# Patient Record
Sex: FEMALE | Race: WHITE | NOT HISPANIC OR LATINO | Employment: FULL TIME | URBAN - METROPOLITAN AREA
[De-identification: names, ages, dates, MRNs, and addresses within clinical notes are randomized per-mention and may not be internally consistent; named-entity substitution may affect disease eponyms.]

---

## 2020-08-10 PROBLEM — E66.812 CLASS 2 DRUG-INDUCED OBESITY WITHOUT SERIOUS COMORBIDITY WITH BODY MASS INDEX (BMI) OF 37.0 TO 37.9 IN ADULT: Status: ACTIVE | Noted: 2020-08-10

## 2022-05-27 PROBLEM — IMO0001 SMOKING: Status: ACTIVE | Noted: 2022-05-27

## 2022-09-12 DIAGNOSIS — Z97.5 BREAKTHROUGH BLEEDING ASSOCIATED WITH INTRAUTERINE DEVICE (IUD): Primary | ICD-10-CM

## 2022-09-12 DIAGNOSIS — N92.1 BREAKTHROUGH BLEEDING ASSOCIATED WITH INTRAUTERINE DEVICE (IUD): Primary | ICD-10-CM

## 2022-09-12 DIAGNOSIS — R10.2 PELVIC PAIN: ICD-10-CM

## 2022-09-12 DIAGNOSIS — Z97.5 IUD CONTRACEPTION: ICD-10-CM

## 2023-07-13 NOTE — PROGRESS NOTES
[facility] [address] [place] [address]    ASSESSMENT/PLAN: Sherie Spencer is a 33 y.o. Yessenia Friend who presents for annual gynecologic exam.    Encounter for routine gynecologic examination  - Routine well woman exam completed today. - Cervical Cancer Screening: Current ASCCP Guidelines reviewed. Last Pap: 08/04/2021 normal. Next Pap Due: next year  - HPV Vaccination status: Immunization series complete  - STI screening offered including HIV testing: not indicated based on hx or requested at time of visit  - Contraceptive counseling discussed. Current contraception: [facility] 1/2020  - RTO 1 yr     Additional problems addressed during this visit:  1. Encounter for gynecological examination (general) (routine) without abnormal findings    2. IUD check up    - IUD string not visualized, u/s 9/2022- IUD in proper position    CC:  Annual Gynecologic Examination    HPI: Sherie Spencer is a 33 y.o. Yessenia Friend who presents for annual gynecologic examination. Erasto Prieto presents for gyn exam today. 7/7/2023 LMP. Menses is regular, flow is heavy some months and lasting 9-13 days. Using [facility] as birth control method. Planning pregnancy when IUD removed. Last pap smear 2021. History of abnormal pap smear- no. Sexually active- yes, with partner for 3 yrs. HPV vaccine - completed. Does not desire STI testing. 7 hrs sleep per day. 1-2 servings of calcium rich food per day. Was walking 7 days per week. 1-2 servings of caffeine per day. Does not perform SBE monthly. Safe at home - yes. Wears seat belts. Concerns : none        The following portions of the patient's history were reviewed and updated as appropriate: allergies, current medications, past family history, past medical history, obstetric history, gynecologic history, past social history, past surgical history and problem list.    Review of Systems   Constitutional: Negative for chills, fatigue and fever.    Respiratory: Negative for cough and shortness of breath. Cardiovascular: Negative for chest pain, palpitations and leg swelling. Gastrointestinal: Negative for constipation and diarrhea. Genitourinary: Positive for frequency. Negative for difficulty urinating, dysuria, menstrual problem, pelvic pain, urgency, vaginal bleeding, vaginal discharge and vaginal pain. Neurological: Negative for light-headedness and headaches. Psychiatric/Behavioral: The patient is nervous/anxious. Objective:  /72   Ht 5' 3" (1.6 m)   Wt 90.7 kg (200 lb)   LMP 07/07/2023 (Exact Date)   BMI 35.43 kg/m²    Physical Exam  Vitals reviewed. Constitutional:       Appearance: Normal appearance. HENT:      Head: Normocephalic. Neck:      Thyroid: No thyroid mass or thyroid tenderness. Cardiovascular:      Rate and Rhythm: Normal rate and regular rhythm. Heart sounds: Normal heart sounds. Pulmonary:      Effort: Pulmonary effort is normal.      Breath sounds: Normal breath sounds. Chest:   Breasts:     Right: No mass, nipple discharge, skin change or tenderness. Left: No mass, nipple discharge, skin change or tenderness. Abdominal:      General: There is no distension. Palpations: There is no mass. Tenderness: There is no abdominal tenderness. There is no guarding. Genitourinary:     General: Normal vulva. Exam position: Lithotomy position. Labia:         Right: No tenderness or lesion. Left: No tenderness or lesion. Vagina: No vaginal discharge, tenderness, bleeding or lesions. Cervix: No discharge, lesion, erythema or cervical bleeding. Uterus: Normal. Not enlarged and not tender. Adnexa:         Right: No mass, tenderness or fullness. Left: No mass, tenderness or fullness. Comments: IUD not visualized. Had ultrasound 9/2022- IUD in place  Musculoskeletal:      Cervical back: Normal range of motion.    Lymphadenopathy:      Upper Body:      Right upper body: No axillary adenopathy. Left upper body: No axillary adenopathy. Skin:     General: Skin is warm and dry. Neurological:      Mental Status: She is alert.    Psychiatric:         Mood and Affect: Mood normal.         Behavior: Behavior normal.         Judgment: Judgment normal.

## 2023-07-14 ENCOUNTER — ANNUAL EXAM (OUTPATIENT)
Dept: OBGYN CLINIC | Facility: CLINIC | Age: 28
End: 2023-07-14
Payer: COMMERCIAL

## 2023-07-14 VITALS
WEIGHT: 200 LBS | SYSTOLIC BLOOD PRESSURE: 118 MMHG | HEIGHT: 63 IN | BODY MASS INDEX: 35.44 KG/M2 | DIASTOLIC BLOOD PRESSURE: 72 MMHG

## 2023-07-14 DIAGNOSIS — Z30.431 IUD CHECK UP: ICD-10-CM

## 2023-07-14 DIAGNOSIS — Z01.419 ENCOUNTER FOR GYNECOLOGICAL EXAMINATION (GENERAL) (ROUTINE) WITHOUT ABNORMAL FINDINGS: Primary | ICD-10-CM

## 2023-07-14 PROCEDURE — S0612 ANNUAL GYNECOLOGICAL EXAMINA: HCPCS | Performed by: ADVANCED PRACTICE MIDWIFE

## 2023-07-14 RX ORDER — AMITRIPTYLINE HYDROCHLORIDE 10 MG/1
10 TABLET, FILM COATED ORAL
COMMUNITY
Start: 2023-04-17 | End: 2023-07-14 | Stop reason: ALTCHOICE

## 2023-07-14 RX ORDER — LEVONORGESTREL 19.5 MG/1
1 INTRAUTERINE DEVICE INTRAUTERINE
COMMUNITY
Start: 2020-01-30

## 2023-10-19 ENCOUNTER — OFFICE VISIT (OUTPATIENT)
Dept: FAMILY MEDICINE CLINIC | Facility: CLINIC | Age: 28
End: 2023-10-19
Payer: COMMERCIAL

## 2023-10-19 ENCOUNTER — APPOINTMENT (OUTPATIENT)
Dept: LAB | Facility: CLINIC | Age: 28
End: 2023-10-19
Payer: COMMERCIAL

## 2023-10-19 VITALS
OXYGEN SATURATION: 98 % | TEMPERATURE: 98 F | HEIGHT: 63 IN | RESPIRATION RATE: 18 BRPM | WEIGHT: 201.6 LBS | SYSTOLIC BLOOD PRESSURE: 132 MMHG | DIASTOLIC BLOOD PRESSURE: 80 MMHG | BODY MASS INDEX: 35.72 KG/M2 | HEART RATE: 71 BPM

## 2023-10-19 DIAGNOSIS — B35.1 NAIL FUNGAL INFECTION: ICD-10-CM

## 2023-10-19 DIAGNOSIS — E66.1 CLASS 2 DRUG-INDUCED OBESITY WITHOUT SERIOUS COMORBIDITY WITH BODY MASS INDEX (BMI) OF 37.0 TO 37.9 IN ADULT: ICD-10-CM

## 2023-10-19 DIAGNOSIS — E53.8 VITAMIN B12 DEFICIENCY: ICD-10-CM

## 2023-10-19 DIAGNOSIS — E55.9 VITAMIN D DEFICIENCY: ICD-10-CM

## 2023-10-19 DIAGNOSIS — Z00.00 ENCOUNTER FOR WELL ADULT EXAM WITHOUT ABNORMAL FINDINGS: Primary | ICD-10-CM

## 2023-10-19 PROBLEM — K58.0 IRRITABLE BOWEL SYNDROME WITH DIARRHEA: Status: RESOLVED | Noted: 2017-10-17 | Resolved: 2023-10-19

## 2023-10-19 LAB
25(OH)D3 SERPL-MCNC: 17.1 NG/ML (ref 30–100)
ALBUMIN SERPL BCP-MCNC: 4.3 G/DL (ref 3.5–5)
ALP SERPL-CCNC: 57 U/L (ref 34–104)
ALT SERPL W P-5'-P-CCNC: 17 U/L (ref 7–52)
ANION GAP SERPL CALCULATED.3IONS-SCNC: 10 MMOL/L
AST SERPL W P-5'-P-CCNC: 16 U/L (ref 13–39)
BASOPHILS # BLD AUTO: 0.03 THOUSANDS/ÂΜL (ref 0–0.1)
BASOPHILS NFR BLD AUTO: 0 % (ref 0–1)
BILIRUB SERPL-MCNC: 0.42 MG/DL (ref 0.2–1)
BUN SERPL-MCNC: 19 MG/DL (ref 5–25)
CALCIUM SERPL-MCNC: 10.4 MG/DL (ref 8.4–10.2)
CHLORIDE SERPL-SCNC: 105 MMOL/L (ref 96–108)
CHOLEST SERPL-MCNC: 135 MG/DL
CO2 SERPL-SCNC: 25 MMOL/L (ref 21–32)
CREAT SERPL-MCNC: 0.85 MG/DL (ref 0.6–1.3)
EOSINOPHIL # BLD AUTO: 0 THOUSAND/ÂΜL (ref 0–0.61)
EOSINOPHIL NFR BLD AUTO: 0 % (ref 0–6)
ERYTHROCYTE [DISTWIDTH] IN BLOOD BY AUTOMATED COUNT: 11.9 % (ref 11.6–15.1)
GFR SERPL CREATININE-BSD FRML MDRD: 94 ML/MIN/1.73SQ M
GLUCOSE P FAST SERPL-MCNC: 114 MG/DL (ref 65–99)
HCT VFR BLD AUTO: 39.6 % (ref 34.8–46.1)
HDLC SERPL-MCNC: 47 MG/DL
HGB BLD-MCNC: 13.6 G/DL (ref 11.5–15.4)
IMM GRANULOCYTES # BLD AUTO: 0.04 THOUSAND/UL (ref 0–0.2)
IMM GRANULOCYTES NFR BLD AUTO: 1 % (ref 0–2)
LDLC SERPL CALC-MCNC: 72 MG/DL (ref 0–100)
LYMPHOCYTES # BLD AUTO: 1.99 THOUSANDS/ÂΜL (ref 0.6–4.47)
LYMPHOCYTES NFR BLD AUTO: 27 % (ref 14–44)
MCH RBC QN AUTO: 30.5 PG (ref 26.8–34.3)
MCHC RBC AUTO-ENTMCNC: 34.3 G/DL (ref 31.4–37.4)
MCV RBC AUTO: 89 FL (ref 82–98)
MONOCYTES # BLD AUTO: 0.52 THOUSAND/ÂΜL (ref 0.17–1.22)
MONOCYTES NFR BLD AUTO: 7 % (ref 4–12)
NEUTROPHILS # BLD AUTO: 4.83 THOUSANDS/ÂΜL (ref 1.85–7.62)
NEUTS SEG NFR BLD AUTO: 65 % (ref 43–75)
NRBC BLD AUTO-RTO: 0 /100 WBCS
PLATELET # BLD AUTO: 329 THOUSANDS/UL (ref 149–390)
PMV BLD AUTO: 9.8 FL (ref 8.9–12.7)
POTASSIUM SERPL-SCNC: 4 MMOL/L (ref 3.5–5.3)
PROT SERPL-MCNC: 7.1 G/DL (ref 6.4–8.4)
RBC # BLD AUTO: 4.46 MILLION/UL (ref 3.81–5.12)
SODIUM SERPL-SCNC: 140 MMOL/L (ref 135–147)
TRIGL SERPL-MCNC: 79 MG/DL
TSH SERPL DL<=0.05 MIU/L-ACNC: 1.72 UIU/ML (ref 0.45–4.5)
VIT B12 SERPL-MCNC: 253 PG/ML (ref 180–914)
WBC # BLD AUTO: 7.41 THOUSAND/UL (ref 4.31–10.16)

## 2023-10-19 PROCEDURE — 80061 LIPID PANEL: CPT

## 2023-10-19 PROCEDURE — 85025 COMPLETE CBC W/AUTO DIFF WBC: CPT

## 2023-10-19 PROCEDURE — 82306 VITAMIN D 25 HYDROXY: CPT

## 2023-10-19 PROCEDURE — 99395 PREV VISIT EST AGE 18-39: CPT | Performed by: NURSE PRACTITIONER

## 2023-10-19 PROCEDURE — 80053 COMPREHEN METABOLIC PANEL: CPT

## 2023-10-19 PROCEDURE — 84443 ASSAY THYROID STIM HORMONE: CPT

## 2023-10-19 PROCEDURE — 83036 HEMOGLOBIN GLYCOSYLATED A1C: CPT

## 2023-10-19 PROCEDURE — 36415 COLL VENOUS BLD VENIPUNCTURE: CPT

## 2023-10-19 PROCEDURE — 82607 VITAMIN B-12: CPT

## 2023-10-19 RX ORDER — CLOTRIMAZOLE 1 %
CREAM (GRAM) TOPICAL 2 TIMES DAILY
Qty: 28 G | Refills: 0 | Status: SHIPPED | OUTPATIENT
Start: 2023-10-19

## 2023-10-19 NOTE — PROGRESS NOTES
ADULT ANNUAL [facility]    NAME: Timo Royal  AGE: 33 y.o. 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 Appropriate education was printed on patient's after visit summary. Counseling:  Alcohol/drug use: discussed moderation in alcohol intake, the recommendations for healthy alcohol use, and avoidance of illicit drug use. Dental Health: discussed importance of regular tooth brushing, flossing, and dental visits. Injury prevention: discussed safety/seat belts, safety helmets, smoke detectors, carbon dioxide detectors, and smoking near bedding or upholstery. Sexual health: discussed sexually transmitted diseases, partner selection, use of condoms, avoidance of unintended pregnancy, and contraceptive alternatives. Exercise: the importance of regular exercise/physical activity was discussed. Recommend exercise 3-5 times per week for at least 30 minutes. BMI Counseling: Body mass index is 35.71 kg/m². The BMI is above normal. Nutrition recommendations include decreasing portion sizes.  Exercise recommendations include moderate physical activity 150 minutes/week and exercising 3-5 times per week. Rationale for BMI follow-up plan is due to patient being overweight or obese. Return in about 1 year (around 10/19/2024). Chief Complaint:     Chief Complaint   Patient presents with    Physical Exam     Fungal infection on finger nail. History of Present Illness:     Adult Annual Physical   Patient here for a comprehensive physical exam. The patient reports problems - see below . Nail fungus, left hand 4th digit. She reports the skin/nail is irritated, has been occurring for years    Diet and Physical Activity  Diet/Nutrition: well balanced diet. Exercise: walking. Depression Screening  PHQ-2/9 Depression Screening    Little interest or pleasure in doing things: 1 - several days  Feeling down, depressed, or hopeless: 1 - several days  Trouble falling or staying asleep, or sleeping too much: 1 - several days  Feeling tired or having little energy: 1 - several days  Poor appetite or overeatin - several days  Feeling bad about yourself - or that you are a failure or have let yourself or your family down: 1 - several days  Trouble concentrating on things, such as reading the newspaper or watching television: 0 - not at all  Moving or speaking so slowly that other people could have noticed. Or the opposite - being so fidgety or restless that you have been moving around a lot more than usual: 0 - not at all  Thoughts that you would be better off dead, or of hurting yourself in some way: 0 - not at all  PHQ-9 Score: 6   PHQ-9 Interpretation: Mild depression          General Health  Sleep: gets 7-8 hours of sleep on average. Hearing: normal - bilateral.  Vision: no vision problems. Dental: regular dental visits. /GYN Health  Last menstrual period: 2x monthly  Contraceptive method: IUD placement. History of STDs?: no.    Advanced Care Planning  Do you have an advanced directive? no  Do you have a durable medical power of ?  no     Review of Systems:     Review of Systems   Constitutional:  Negative for activity change, chills, fatigue and fever. HENT:  Negative for congestion, ear discharge, ear pain, sinus pressure, sinus pain, sore throat, tinnitus and trouble swallowing. Eyes:  Negative for photophobia, pain, discharge, itching and visual disturbance. Respiratory:  Negative for cough, chest tightness, shortness of breath and wheezing. Cardiovascular:  Negative for chest pain and leg swelling. Gastrointestinal:  Negative for abdominal distention, abdominal pain, constipation, diarrhea, nausea and vomiting. Endocrine: Negative for polydipsia, polyphagia and polyuria. Genitourinary:  Negative for dysuria and frequency. Musculoskeletal:  Negative for arthralgias, myalgias, neck pain and neck stiffness. Skin:  Positive for color change. Neurological:  Negative for dizziness, syncope, weakness, numbness and headaches. Hematological:  Does not bruise/bleed easily. Psychiatric/Behavioral:  Negative for behavioral problems, confusion, self-injury, sleep disturbance and suicidal ideas. The patient is not nervous/anxious. Past Medical History:     Past Medical History:   Diagnosis Date    Anxiety     Contraception     Diarrhea     GERD (gastroesophageal reflux disease)     IUD contraception 01/30/2020    Eligio Hector      Past Surgical History:     Past Surgical History:   Procedure Laterality Date    APPENDECTOMY  5/27/22    APPENDECTOMY LAPAROSCOPIC N/A 05/27/2022    Procedure: APPENDECTOMY LAPAROSCOPIC;  Surgeon: Samantha Angulo MD;  Location: [facility];  Service: General    COLONOSCOPY      COLONOSCOPY N/A 03/30/2018    Procedure: COLONOSCOPY;  Surgeon: Omero Link MD;  Location: [facility];   Service: Gastroenterology    ESOPHAGOGASTRODUODENOSCOPY      WISDOM TOOTH EXTRACTION        Social History:     Social History     Socioeconomic History    Marital status: /Civil Union     Spouse name: None    Number of children: None    Years of education: None    Highest education level: None   Occupational History    Occupation:  at [employer]    Smoking status: Former     Packs/day: 0.00     Years: 1.00     Total pack years: 0.00     Types: Cigarettes     Start date: 2019    Smokeless tobacco: Never    Tobacco comments:     a pack a month   Vaping Use    Vaping Use: Former   Substance and Sexual Activity    Alcohol use: Yes     Comment: Socially    Drug use: Yes     Types: Marijuana     Comment: medially    Sexual activity: Yes     Partners: Male     Birth control/protection: I.U.D. Comment: [email or web address]   Other Topics Concern    None   Social History Narrative    None     Social Determinants of Health     Financial Resource Strain: Not on file   Food Insecurity: Not on file   Transportation Needs: Not on file   Physical Activity: Not on file   Stress: Not on file   Social Connections: Not on file   Intimate Partner Violence: Not on file   Housing Stability: Not on file      Family History:     Family History   Problem Relation Age of Onset    No Known Problems Mother     Obesity Father     Diabetes Father     No Known Problems Brother     No Known Problems Brother     Pancreatic cancer Maternal Grandmother     Cancer Maternal Grandmother         Pancreatic    Stroke Maternal Grandfather     Obesity Paternal Grandmother     Congenital heart disease Paternal Grandmother     Diabetes Paternal Grandmother     Diabetes Paternal Grandfather     Diabetes Family       Current Medications:     Current Outpatient Medications   Medication Sig Dispense Refill    clotrimazole (LOTRIMIN) 1 % cream Apply topically 2 (two) times a day 28 g 0    levonorgestrel (Kyleena) 19.5 MG intrauterine device 1 Units by Intrauterine route       No current facility-administered medications for this visit. Allergies:      Allergies   Allergen Reactions    Amoxicillin     Cefaclor     Penicillin V       Physical Exam:     /80 (BP Location: Left arm, Patient Position: Sitting, Cuff Size: Standard)   Pulse 71   Temp 98 °F (36.7 °C) (Tympanic)   Resp 18   Ht 5' 3" (1.6 m)   Wt 91.4 kg (201 lb 9.6 oz)   SpO2 98%   BMI 35.71 kg/m²     Physical Exam  Constitutional:       General: She is not in acute distress. Appearance: She is well-developed. She is obese. She is not ill-appearing. HENT:      Head: Normocephalic and atraumatic. Right Ear: Tympanic membrane, ear canal and external ear normal.      Left Ear: Tympanic membrane, ear canal and external ear normal.      Nose: Nose normal.      Mouth/Throat:      Mouth: Mucous membranes are moist.   Eyes:      Extraocular Movements: Extraocular movements intact. Conjunctiva/sclera: Conjunctivae normal.      Pupils: Pupils are equal, round, and reactive to light. Cardiovascular:      Rate and Rhythm: Normal rate and regular rhythm. Pulses: Normal pulses. Heart sounds: Normal heart sounds. Pulmonary:      Effort: Pulmonary effort is normal.      Breath sounds: Normal breath sounds. No wheezing or rhonchi. Abdominal:      General: There is no distension. Palpations: Abdomen is soft. Tenderness: There is no abdominal tenderness. Musculoskeletal:         General: No swelling, tenderness, deformity or signs of injury. Normal range of motion. Cervical back: Normal range of motion and neck supple. Right lower leg: No edema. Left lower leg: No edema. Skin:     General: Skin is warm. Findings: Rash present. No bruising, erythema or lesion. Neurological:      General: No focal deficit present. Mental Status: She is alert and oriented to person, place, and time. Psychiatric:         Mood and Affect: Mood normal.         Behavior: Behavior normal.         Thought Content: Thought content normal.         Judgment: Judgment normal.      BMI Counseling: Body mass index is 35.71 kg/m².  The BMI is above normal. Nutrition recommendations include reducing portion sizes. Exercise recommendations include moderate aerobic physical activity for 150 minutes/week.     [id number]

## 2023-10-20 DIAGNOSIS — E55.9 VITAMIN D DEFICIENCY: Primary | ICD-10-CM

## 2023-10-20 DIAGNOSIS — E83.52 HYPERCALCEMIA: ICD-10-CM

## 2023-10-20 LAB
EST. AVERAGE GLUCOSE BLD GHB EST-MCNC: 114 MG/DL
HBA1C MFR BLD: 5.6 %

## 2023-10-20 RX ORDER — ERGOCALCIFEROL 1.25 MG/1
50000 CAPSULE ORAL WEEKLY
Qty: 12 CAPSULE | Refills: 0 | Status: SHIPPED | OUTPATIENT
Start: 2023-10-20

## 2023-11-22 ENCOUNTER — LAB (OUTPATIENT)
Dept: LAB | Facility: CLINIC | Age: 28
End: 2023-11-22
Payer: COMMERCIAL

## 2023-11-22 DIAGNOSIS — E83.52 HYPERCALCEMIA: ICD-10-CM

## 2023-11-22 LAB
ALBUMIN SERPL BCP-MCNC: 4.5 G/DL (ref 3.5–5)
ALP SERPL-CCNC: 65 U/L (ref 34–104)
ALT SERPL W P-5'-P-CCNC: 20 U/L (ref 7–52)
ANION GAP SERPL CALCULATED.3IONS-SCNC: 10 MMOL/L
AST SERPL W P-5'-P-CCNC: 21 U/L (ref 13–39)
BILIRUB SERPL-MCNC: 0.6 MG/DL (ref 0.2–1)
BUN SERPL-MCNC: 13 MG/DL (ref 5–25)
CALCIUM SERPL-MCNC: 9.8 MG/DL (ref 8.4–10.2)
CHLORIDE SERPL-SCNC: 105 MMOL/L (ref 96–108)
CO2 SERPL-SCNC: 27 MMOL/L (ref 21–32)
CREAT SERPL-MCNC: 0.88 MG/DL (ref 0.6–1.3)
GFR SERPL CREATININE-BSD FRML MDRD: 90 ML/MIN/1.73SQ M
GLUCOSE P FAST SERPL-MCNC: 86 MG/DL (ref 65–99)
POTASSIUM SERPL-SCNC: 4.1 MMOL/L (ref 3.5–5.3)
PROT SERPL-MCNC: 7.2 G/DL (ref 6.4–8.4)
PTH-INTACT SERPL-MCNC: 45.3 PG/ML (ref 12–88)
SODIUM SERPL-SCNC: 142 MMOL/L (ref 135–147)

## 2023-11-22 PROCEDURE — 80053 COMPREHEN METABOLIC PANEL: CPT

## 2023-11-22 PROCEDURE — 36415 COLL VENOUS BLD VENIPUNCTURE: CPT

## 2023-11-22 PROCEDURE — 83970 ASSAY OF PARATHORMONE: CPT

## 2023-11-24 ENCOUNTER — TELEPHONE (OUTPATIENT)
Dept: FAMILY MEDICINE CLINIC | Facility: CLINIC | Age: 28
End: 2023-11-24

## 2023-11-24 NOTE — RESULT ENCOUNTER NOTE
Please let her know that her follow-up calcium and PTH were both normal.  No further workup at this time

## 2023-11-27 NOTE — TELEPHONE ENCOUNTER
Contacted patient and let her know  that her follow-up calcium and PTH were both normal.  No further workup at this time

## 2024-01-05 DIAGNOSIS — E55.9 VITAMIN D DEFICIENCY: ICD-10-CM

## 2024-01-05 RX ORDER — ERGOCALCIFEROL 1.25 MG/1
50000 CAPSULE ORAL WEEKLY
Qty: 12 CAPSULE | Refills: 0 | Status: SHIPPED | OUTPATIENT
Start: 2024-01-05

## 2024-06-18 ENCOUNTER — PROCEDURE VISIT (OUTPATIENT)
Dept: OBGYN CLINIC | Facility: CLINIC | Age: 29
End: 2024-06-18
Payer: COMMERCIAL

## 2024-06-18 VITALS
SYSTOLIC BLOOD PRESSURE: 118 MMHG | DIASTOLIC BLOOD PRESSURE: 68 MMHG | HEIGHT: 63 IN | WEIGHT: 177 LBS | BODY MASS INDEX: 31.36 KG/M2

## 2024-06-18 DIAGNOSIS — T83.32XA INTRAUTERINE CONTRACEPTIVE DEVICE THREADS LOST, INITIAL ENCOUNTER: ICD-10-CM

## 2024-06-18 DIAGNOSIS — T83.39XA RETAINED INTRAUTERINE CONTRACEPTIVE DEVICE (IUD): Primary | ICD-10-CM

## 2024-06-18 PROCEDURE — 58301 REMOVE INTRAUTERINE DEVICE: CPT | Performed by: STUDENT IN AN ORGANIZED HEALTH CARE EDUCATION/TRAINING PROGRAM

## 2024-06-18 PROCEDURE — 99214 OFFICE O/P EST MOD 30 MIN: CPT | Performed by: STUDENT IN AN ORGANIZED HEALTH CARE EDUCATION/TRAINING PROGRAM

## 2024-06-18 NOTE — PROGRESS NOTES
"[facility] [address], [place], [place]    Assessment/Plan:  Merna Zarco is a 28 y.o.  who presents for IUD removal with lost strings.    Retained intrauterine contraceptive device (IUD)  - IUD threads were lost, but recent US shows IUD in the uterus  - Graspers were used to attempt removal in the office today but not successful  - Gave patient option of second office attempt with US versus OR attempt with hysteroscopy and IV sedation.  Patient opts for OR removal.  - Written informed consent obtained today.  Surgical scheduler messaged.    Diagnoses and all orders for this visit:    Retained intrauterine contraceptive device (IUD)    Intrauterine contraceptive device threads lost, initial encounter          Subjective:   Merna Zarco is a 28 y.o.  female.  CC: IUD removal    HPI: Merna presents for IUD removal. She reports that the IUD threads are lost.        ROS: Review of Systems   Constitutional:  Negative for chills and fever.   Respiratory:  Negative for cough and shortness of breath.    Cardiovascular:  Negative for chest pain and leg swelling.   Gastrointestinal:  Negative for abdominal pain, nausea and vomiting.   Genitourinary:  Negative for dysuria, frequency and urgency.   Neurological:  Negative for dizziness, light-headedness and headaches.       PFSH: The following portions of the patient's history were reviewed and updated as appropriate: allergies, current medications, past family history, past medical history, obstetric history, gynecologic history, past social history, past surgical history and problem list.       Objective:  /68   Ht 5' 3\" (1.6 m)   Wt 80.3 kg (177 lb)   LMP 2024 (Exact Date)   BMI 31.35 kg/m²    Physical Exam  Constitutional:       Appearance: Normal appearance.   HENT:      Head: Normocephalic and atraumatic.      Mouth/Throat:      Mouth: Mucous membranes are moist.      Pharynx: Oropharynx is clear. No " oropharyngeal exudate.   Cardiovascular:      Rate and Rhythm: Normal rate and regular rhythm.      Pulses: Normal pulses.      Heart sounds: Normal heart sounds. No murmur heard.     No friction rub. No gallop.   Pulmonary:      Effort: Pulmonary effort is normal. No respiratory distress.      Breath sounds: Normal breath sounds. No wheezing, rhonchi or rales.   Abdominal:      General: Abdomen is flat. There is no distension.      Palpations: Abdomen is soft. There is no mass.      Tenderness: There is no abdominal tenderness. There is no guarding.      Hernia: No hernia is present.   Genitourinary:     Exam position: Lithotomy position.      Pubic Area: No rash.       Labia:         Right: No rash, tenderness or lesion.         Left: No rash, tenderness or lesion.       Urethra: No prolapse, urethral swelling or urethral lesion.      Vagina: No vaginal discharge, erythema, tenderness, bleeding or lesions.      Cervix: No cervical motion tenderness, discharge, friability or erythema.   Musculoskeletal:         General: No deformity or signs of injury. Normal range of motion.      Right lower leg: No edema.      Left lower leg: No edema.   Lymphadenopathy:      Lower Body: No right inguinal adenopathy. No left inguinal adenopathy.   Skin:     General: Skin is warm and dry.   Neurological:      General: No focal deficit present.      Mental Status: She is alert and oriented to person, place, and time.   Psychiatric:         Mood and Affect: Mood normal.         Behavior: Behavior normal.           Iud removal    Performed by: Tika Montero MD  Authorized by: Tika Montero MD    Procedure: IUD removal    Reason for removal: patient request    Cervix cleaned with: iodopovidone    Tenaculum applied to cervix: no    Cervix dilated: yes    Cervix dilated with:  Vipul  IUD grasped by forceps: yes    IUD removed: no    Unable to remove IUD, refer for: removal at another facility     Threads lost.  Attempted  removal with Nalini. Patient requested to stop trying to remove IUD in office.  Opts for OR removal with sedation.        I have spent a total time of 30 minutes on 06/18/24 in caring for this patient including Diagnostic results and Prognosis.    Tika Montero MD  [facility]  [facility]  6/18/2024 2:42 PM

## 2024-06-18 NOTE — H&P (VIEW-ONLY)
"[facility] [address], [place], [place]    Assessment/Plan:  Merna Zarco is a 28 y.o.  who presents for IUD removal with lost strings.    Retained intrauterine contraceptive device (IUD)  - IUD threads were lost, but recent US shows IUD in the uterus  - Graspers were used to attempt removal in the office today but not successful  - Gave patient option of second office attempt with US versus OR attempt with hysteroscopy and IV sedation.  Patient opts for OR removal.  - Written informed consent obtained today.  Surgical scheduler messaged.    Diagnoses and all orders for this visit:    Retained intrauterine contraceptive device (IUD)    Intrauterine contraceptive device threads lost, initial encounter          Subjective:   Merna Zarco is a 28 y.o.  female.  CC: IUD removal    HPI: Merna presents for IUD removal. She reports that the IUD threads are lost.        ROS: Review of Systems   Constitutional:  Negative for chills and fever.   Respiratory:  Negative for cough and shortness of breath.    Cardiovascular:  Negative for chest pain and leg swelling.   Gastrointestinal:  Negative for abdominal pain, nausea and vomiting.   Genitourinary:  Negative for dysuria, frequency and urgency.   Neurological:  Negative for dizziness, light-headedness and headaches.       PFSH: The following portions of the patient's history were reviewed and updated as appropriate: allergies, current medications, past family history, past medical history, obstetric history, gynecologic history, past social history, past surgical history and problem list.       Objective:  /68   Ht 5' 3\" (1.6 m)   Wt 80.3 kg (177 lb)   LMP 2024 (Exact Date)   BMI 31.35 kg/m²    Physical Exam  Constitutional:       Appearance: Normal appearance.   HENT:      Head: Normocephalic and atraumatic.      Mouth/Throat:      Mouth: Mucous membranes are moist.      Pharynx: Oropharynx is clear. No " oropharyngeal exudate.   Cardiovascular:      Rate and Rhythm: Normal rate and regular rhythm.      Pulses: Normal pulses.      Heart sounds: Normal heart sounds. No murmur heard.     No friction rub. No gallop.   Pulmonary:      Effort: Pulmonary effort is normal. No respiratory distress.      Breath sounds: Normal breath sounds. No wheezing, rhonchi or rales.   Abdominal:      General: Abdomen is flat. There is no distension.      Palpations: Abdomen is soft. There is no mass.      Tenderness: There is no abdominal tenderness. There is no guarding.      Hernia: No hernia is present.   Genitourinary:     Exam position: Lithotomy position.      Pubic Area: No rash.       Labia:         Right: No rash, tenderness or lesion.         Left: No rash, tenderness or lesion.       Urethra: No prolapse, urethral swelling or urethral lesion.      Vagina: No vaginal discharge, erythema, tenderness, bleeding or lesions.      Cervix: No cervical motion tenderness, discharge, friability or erythema.   Musculoskeletal:         General: No deformity or signs of injury. Normal range of motion.      Right lower leg: No edema.      Left lower leg: No edema.   Lymphadenopathy:      Lower Body: No right inguinal adenopathy. No left inguinal adenopathy.   Skin:     General: Skin is warm and dry.   Neurological:      General: No focal deficit present.      Mental Status: She is alert and oriented to person, place, and time.   Psychiatric:         Mood and Affect: Mood normal.         Behavior: Behavior normal.           Iud removal    Performed by: Tika Montero MD  Authorized by: Tika Montero MD    Procedure: IUD removal    Reason for removal: patient request    Cervix cleaned with: iodopovidone    Tenaculum applied to cervix: no    Cervix dilated: yes    Cervix dilated with:  Vipul  IUD grasped by forceps: yes    IUD removed: no    Unable to remove IUD, refer for: removal at another facility     Threads lost.  Attempted  removal with Nalini. Patient requested to stop trying to remove IUD in office.  Opts for OR removal with sedation.        I have spent a total time of 30 minutes on 06/18/24 in caring for this patient including Diagnostic results and Prognosis.    Tika Montero MD  [facility]  [facility]  6/18/2024 2:42 PM

## 2024-06-19 ENCOUNTER — TELEPHONE (OUTPATIENT)
Dept: OBGYN CLINIC | Facility: MEDICAL CENTER | Age: 29
End: 2024-06-19

## 2024-06-19 NOTE — TELEPHONE ENCOUNTER
Spoke to patient about her surgery date.. Its July 9, 2024 at [facility].  Case entered in the system and no labs needed.. Pt is aware that the hospital will call her between 4-6 pm the day before to give her a time to report to the hospital.. teams number given..

## 2024-06-19 NOTE — TELEPHONE ENCOUNTER
----- Message from Tika Montero MD sent at 2024  2:36 PM EDT -----  Regarding: Hysteroscopy IUD Removal [facility]  Surgical Scheduling Request    Name: Merna Zarco  : 1995  MRN: [id number]    Procedure: Hysteroscopy IUD Removal, EUA  Diagnosis: Retained IUD    Anesthesia: total IV anesthesia  Admit: outpatient  Location: [facility]  Special Needs / Equipment: Operative hysteroscope with graspers  Surgical PA or 2nd Surgeon Required?:  no  Scheduling Urgency: Add to ?  Patient Scheduling Preference: as soon as available    Medical Clearance: no  Patient Aware of Need: no    Preop Appt: Done today 2024  Postop Appt: No postop needed  Anticipated Leave Time: 1-2 days

## 2024-06-28 NOTE — PRE-PROCEDURE INSTRUCTIONS
No outpatient medications have been marked as taking for the 7/9/24 encounter (Hospital Encounter).    Medication instructions for day surgery reviewed. Please use only a sip of water to take your instructed medications. Avoid all over the counter vitamins, supplements and NSAIDS for one week prior to surgery per anesthesia guidelines. Tylenol is ok to take as needed.     You will receive a call one business day prior to surgery with an arrival time and hospital directions. If your surgery is scheduled on a Monday, the hospital will be calling you on the Friday prior to your surgery. If you have not heard from anyone by 8pm, please call the hospital supervisor through the hospital  at [phone]. ([place] [phone] or [place] [phone]).    Do not eat or drink anything after midnight the night before your surgery, including candy, mints, lifesavers, or chewing gum. Do not drink alcohol 24hrs before your surgery. Try not to smoke at least 24hrs before your surgery.       Follow the pre surgery showering instructions as listed in the “My Surgical Experience Booklet” or otherwise provided by your surgeon's office. Do not use a blade to shave the surgical area 1 week before surgery. It is okay to use a clean electric clippers up to 24 hours before surgery. Do not apply any lotions, creams, including makeup, cologne, deodorant, or perfumes after showering on the day of your surgery. Do not use dry shampoo, hair spray, hair gel, or any type of hair products.     No contact lenses, eye make-up, or artificial eyelashes. Remove nail polish, including gel polish, and any artificial, gel, or acrylic nails if possible. Remove all jewelry including rings and body piercing jewelry.     Wear causal clothing that is easy to take on and off. Consider your type of surgery.    Keep any valuables, jewelry, piercings at home. Please bring any specially ordered equipment (sling, braces) if indicated.    Arrange for a  responsible person to drive you to and from the hospital on the day of your surgery. Please confirm the visitor policy for the day of your procedure when you receive your phone call with an arrival time.     Call the surgeon's office with any new illnesses, exposures, or additional questions prior to surgery.    Please reference your “My Surgical Experience Booklet” for additional information to prepare for your upcoming surgery.

## 2024-07-08 ENCOUNTER — ANESTHESIA EVENT (OUTPATIENT)
Dept: PERIOP | Facility: HOSPITAL | Age: 29
End: 2024-07-08
Payer: COMMERCIAL

## 2024-07-09 ENCOUNTER — HOSPITAL ENCOUNTER (OUTPATIENT)
Facility: HOSPITAL | Age: 29
Setting detail: OUTPATIENT SURGERY
Discharge: HOME | End: 2024-07-09
Attending: STUDENT IN AN ORGANIZED HEALTH CARE EDUCATION/TRAINING PROGRAM | Admitting: STUDENT IN AN ORGANIZED HEALTH CARE EDUCATION/TRAINING PROGRAM
Payer: COMMERCIAL

## 2024-07-09 ENCOUNTER — ANESTHESIA (OUTPATIENT)
Dept: PERIOP | Facility: HOSPITAL | Age: 29
End: 2024-07-09
Payer: COMMERCIAL

## 2024-07-09 VITALS
TEMPERATURE: 97.3 F | BODY MASS INDEX: 31.01 KG/M2 | RESPIRATION RATE: 18 BRPM | DIASTOLIC BLOOD PRESSURE: 61 MMHG | SYSTOLIC BLOOD PRESSURE: 107 MMHG | WEIGHT: 175 LBS | HEIGHT: 63 IN | HEART RATE: 55 BPM | OXYGEN SATURATION: 99 %

## 2024-07-09 PROBLEM — F12.90 MARIJUANA SMOKER: Status: ACTIVE | Noted: 2022-03-03

## 2024-07-09 LAB
EXT PREGNANCY TEST URINE: NEGATIVE
EXT. CONTROL: NORMAL

## 2024-07-09 PROCEDURE — 81025 URINE PREGNANCY TEST: CPT | Performed by: STUDENT IN AN ORGANIZED HEALTH CARE EDUCATION/TRAINING PROGRAM

## 2024-07-09 PROCEDURE — 58562 HYSTEROSCOPY REMOVE FB: CPT | Performed by: STUDENT IN AN ORGANIZED HEALTH CARE EDUCATION/TRAINING PROGRAM

## 2024-07-09 RX ORDER — ONDANSETRON 2 MG/ML
4 INJECTION INTRAMUSCULAR; INTRAVENOUS ONCE AS NEEDED
Status: DISCONTINUED | OUTPATIENT
Start: 2024-07-09 | End: 2024-07-09 | Stop reason: HOSPADM

## 2024-07-09 RX ORDER — FENTANYL CITRATE/PF 50 MCG/ML
25 SYRINGE (ML) INJECTION
Status: DISCONTINUED | OUTPATIENT
Start: 2024-07-09 | End: 2024-07-09 | Stop reason: HOSPADM

## 2024-07-09 RX ORDER — HYDROMORPHONE HCL/PF 1 MG/ML
0.5 SYRINGE (ML) INJECTION
Status: DISCONTINUED | OUTPATIENT
Start: 2024-07-09 | End: 2024-07-09 | Stop reason: HOSPADM

## 2024-07-09 RX ORDER — SODIUM CHLORIDE, SODIUM LACTATE, POTASSIUM CHLORIDE, CALCIUM CHLORIDE 600; 310; 30; 20 MG/100ML; MG/100ML; MG/100ML; MG/100ML
75 INJECTION, SOLUTION INTRAVENOUS CONTINUOUS
Status: DISCONTINUED | OUTPATIENT
Start: 2024-07-09 | End: 2024-07-09 | Stop reason: HOSPADM

## 2024-07-09 RX ORDER — ONDANSETRON 2 MG/ML
INJECTION INTRAMUSCULAR; INTRAVENOUS AS NEEDED
Status: DISCONTINUED | OUTPATIENT
Start: 2024-07-09 | End: 2024-07-09

## 2024-07-09 RX ORDER — MIDAZOLAM HYDROCHLORIDE 2 MG/2ML
INJECTION, SOLUTION INTRAMUSCULAR; INTRAVENOUS AS NEEDED
Status: DISCONTINUED | OUTPATIENT
Start: 2024-07-09 | End: 2024-07-09

## 2024-07-09 RX ORDER — SODIUM CHLORIDE, SODIUM LACTATE, POTASSIUM CHLORIDE, CALCIUM CHLORIDE 600; 310; 30; 20 MG/100ML; MG/100ML; MG/100ML; MG/100ML
INJECTION, SOLUTION INTRAVENOUS CONTINUOUS PRN
Status: DISCONTINUED | OUTPATIENT
Start: 2024-07-09 | End: 2024-07-09

## 2024-07-09 RX ORDER — KETOROLAC TROMETHAMINE 30 MG/ML
INJECTION, SOLUTION INTRAMUSCULAR; INTRAVENOUS AS NEEDED
Status: DISCONTINUED | OUTPATIENT
Start: 2024-07-09 | End: 2024-07-09

## 2024-07-09 RX ORDER — LIDOCAINE HYDROCHLORIDE 20 MG/ML
INJECTION, SOLUTION EPIDURAL; INFILTRATION; INTRACAUDAL; PERINEURAL AS NEEDED
Status: DISCONTINUED | OUTPATIENT
Start: 2024-07-09 | End: 2024-07-09

## 2024-07-09 RX ORDER — MAGNESIUM HYDROXIDE 1200 MG/15ML
LIQUID ORAL AS NEEDED
Status: DISCONTINUED | OUTPATIENT
Start: 2024-07-09 | End: 2024-07-09 | Stop reason: HOSPADM

## 2024-07-09 RX ORDER — DIPHENHYDRAMINE HYDROCHLORIDE 50 MG/ML
12.5 INJECTION INTRAMUSCULAR; INTRAVENOUS
Status: DISCONTINUED | OUTPATIENT
Start: 2024-07-09 | End: 2024-07-09 | Stop reason: HOSPADM

## 2024-07-09 RX ORDER — PROPOFOL 10 MG/ML
INJECTION, EMULSION INTRAVENOUS AS NEEDED
Status: DISCONTINUED | OUTPATIENT
Start: 2024-07-09 | End: 2024-07-09

## 2024-07-09 RX ADMIN — MIDAZOLAM 2 MG: 1 INJECTION INTRAMUSCULAR; INTRAVENOUS at 11:56

## 2024-07-09 RX ADMIN — SODIUM CHLORIDE, SODIUM LACTATE, POTASSIUM CHLORIDE, AND CALCIUM CHLORIDE: .6; .31; .03; .02 INJECTION, SOLUTION INTRAVENOUS at 11:56

## 2024-07-09 RX ADMIN — LIDOCAINE HYDROCHLORIDE 100 MG: 20 INJECTION, SOLUTION EPIDURAL; INFILTRATION; INTRACAUDAL; PERINEURAL at 12:08

## 2024-07-09 RX ADMIN — KETOROLAC TROMETHAMINE 30 MG: 30 INJECTION, SOLUTION INTRAMUSCULAR at 12:26

## 2024-07-09 RX ADMIN — PROPOFOL 30 MG: 10 INJECTION, EMULSION INTRAVENOUS at 12:16

## 2024-07-09 RX ADMIN — Medication 10 MCG: at 12:08

## 2024-07-09 RX ADMIN — Medication 10 MCG: at 12:07

## 2024-07-09 RX ADMIN — ONDANSETRON 4 MG: 2 INJECTION INTRAMUSCULAR; INTRAVENOUS at 12:15

## 2024-07-09 RX ADMIN — Medication 10 MCG: at 12:17

## 2024-07-09 RX ADMIN — PROPOFOL 50 MG: 10 INJECTION, EMULSION INTRAVENOUS at 12:08

## 2024-07-09 RX ADMIN — PROPOFOL 100 MCG/KG/MIN: 10 INJECTION, EMULSION INTRAVENOUS at 12:09

## 2024-07-09 NOTE — DISCHARGE INSTR - AVS FIRST PAGE
[facility] Dr. Montero  Office: [phone]     Postoperative Instructions        WHAT YOU NEED TO KNOW:   You had a hysteroscopy IUD removal.  Everything was routine.     DISCHARGE INSTRUCTIONS:   Contact your doctor at the number above if:   You have a fever over 101o.  You have nausea or are vomiting that does not improve after a light meal.   Your pain is getting worse, even after you take medicine.   You feel pain or burning when you urinate, or you have trouble urinating.   You have pus or a foul-smelling odor coming from your vagina.    Your wound is red, swollen, or draining pus.  You see new or an increased amount of bright red blood coming from your vagina or your incisions.   You have questions or concerns about your condition or care.     Seek care immediately:   You have heavy vaginal bleeding that fills 1 or more sanitary pads in 1 hour.     Call 911 for any of the following:   You feel lightheaded, short of breath, and have chest pain.   You cough up blood.     Medicines: You may need any of the following:  NSAIDs, such as ibuprofen, which help decrease swelling and pain.     Activity:   Rest as needed. Get up and move around as directed to help prevent blood clots. Start with short walks and slowly increase the distance every day.  You may shower as soon as the day of surgery.  Tub baths can be taken starting 2 weeks after surgery.  Do not go into pools or hot tubs until cleared by your doctor.

## 2024-07-09 NOTE — OP NOTE
OPERATIVE REPORT  PATIENT NAME: Merna Zarco    :  1995  MRN: [id number]  Pt Location: [facility] OR ROOM 03    SURGERY DATE: 2024    Surgeons and Role:     * Tika Montero MD - Primary    Preop Diagnosis:  Intrauterine contraceptive device threads lost, subsequent encounter [T83.32XD]    Post-Op Diagnosis Codes:     * Intrauterine contraceptive device threads lost, subsequent encounter [T83.32XD]    Procedure(s):  HYSTEROSCOPY FOR IUD REMOVAL. EXAM UNDER ANESTHESIA  REMOVAL OF INTRAUTERINE DEVICE (IUD)    Specimen(s):  * No specimens in log *    Estimated Blood Loss:   Minimal    Drains:  NG/OG/Enteral Tube Orogastric 18 Fr Center mouth (Active)   Number of days: 774       Anesthesia Type:   IV Sedation with Anesthesia    Operative Indications:  Intrauterine contraceptive device threads lost, subsequent encounter [T83.32XD]      Operative Findings:  Pelvic exam reveals normal mobile anteverted uterus. No adnexal fullness. Cervix appeared normal.  IUD strings not visualized.      Complications:   None    Procedure and Technique:    The patient was correctly identified and taken to the operating room where anesthesia was obtained without difficulty.  She was placed in the dorsal lithotomy position and was prepped and draped in a sterile fashion.   The patient  was examined under anesthesia and was found to have an anterverted uterus with normal adnexa.       An open sided speculum was inserted into the vagina and the anterior lip of the cervix was visualized and grasped with a single toothed tenaculum.  Using Quentin dilators, the cervix was progressively dilated to approximately 5 mm.      A 12 degree 5 mm operative hysteroscope was inserted. The entire endometrial cavity was visualized as well as bilateral ostia. The endometrial cavity appeared unremarkable with a normal amount of endometrial tissue. A grasper was placed through the operative channel. The IUD was grasped and removed along with the  hysteroscope.     The tenaculum and speculum were removed. There was no bleeding noted from tenaculum site.  Sponge and instrument count were correct x 2.   The patient tolerated the procedure well.  Anesthesia was reversed and the patient was transported in good condition to PACU.    I was present for the entire procedure.    Patient Disposition:  PACU         SIGNATURE: Tika Montero MD  DATE: July 9, 2024  TIME: 12:33 PM

## 2024-07-09 NOTE — ANESTHESIA POSTPROCEDURE EVALUATION
Post-Op Assessment Note    CV Status:  Stable    Pain management: adequate       Mental Status:  Alert and awake   Hydration Status:  Euvolemic   PONV Controlled:  Controlled   Airway Patency:  Patent     Post Op Vitals Reviewed: Yes    No anethesia notable event occurred.    Staff: Anesthesiologist               BP   95/50   Temp   98.0   Pulse  63   Resp   10   SpO2   98% on RA

## 2024-07-09 NOTE — INTERVAL H&P NOTE
H&P reviewed. After examining the patient I find no changes in the patients condition since the H&P had been written.    Vitals:    07/09/24 1012   BP: 139/66   Pulse: 63   Resp: 18   Temp: (!) 97.3 °F (36.3 °C)   SpO2: 98%     Tika Montero MD  [facility]  [facility]  7/9/2024 11:38 AM

## 2024-07-09 NOTE — ANESTHESIA PREPROCEDURE EVALUATION
"Procedure:  HYSTEROSCOPY FOR IUD REMOVAL, EXAM UNDER ANESTHESIA (Uterus)  REMOVAL OF INTRAUTERINE DEVICE (IUD) (Uterus)    Relevant Problems   HEMATOLOGY   (+) Iron deficiency anemia secondary to inadequate dietary iron intake      NEURO/PSYCH   (+) Anxiety   (+) Mild episode of recurrent major depressive disorder (HCC)      Behavioral Health   (+) Medical marijuana use        Physical Exam    Airway    Mallampati score: III  TM Distance: >3 FB  Neck ROM: full     Dental        Cardiovascular      Pulmonary      Other Findings  post-pubertal.    Anesthesia Plan  ASA Score- 2     Anesthesia Type- IV sedation with anesthesia with ASA Monitors.         Additional Monitors:     Airway Plan:            Plan Factors-Exercise tolerance (METS): >4 METS.    Chart reviewed. EKG reviewed.  Existing labs reviewed. Patient summary reviewed.    Patient is a current smoker.  Patient smoked on day of surgery.            Induction- intravenous.    Postoperative Plan-     Perioperative Resuscitation Plan - Level 1 - Full Code.       Informed Consent- Anesthetic plan and risks discussed with patient.    VITALS  LMP 06/02/2024 (Exact Date)  BP Readings from Last 3 Encounters:   06/18/24 118/68   10/19/23 132/80   07/14/23 118/72        LABS  Results from Last 12 Months   Lab Units 10/19/23  0812   WBC Thousand/uL 7.41   HEMOGLOBIN g/dL 13.6   HEMATOCRIT % 39.6   PLATELETS Thousands/uL 329     Results from Last 12 Months   Lab Units 11/22/23  0736 10/19/23  0812   SODIUM mmol/L 142 140   POTASSIUM mmol/L 4.1 4.0   CHLORIDE mmol/L 105 105   CO2 mmol/L 27 25   ANION GAP mmol/L 10 10   BUN mg/dL 13 19   CREATININE mg/dL 0.88 0.85   CALCIUM mg/dL 9.8 10.4*   HEMOGLOBIN A1C %  --  5.6     No results for input(s): \"APTT\", \"INR\", \"PTT\" in the last 8784 hours.    ECG      ECHOCARDIOGRAPHY OR OTHER TESTING/IMAGING        Narrative & Impression    Normal sinus rhythm  Incomplete right bundle branch block  Confirmed by MARY LOU WHITE MD ([id number]) on " 2017 1:47:09 PM          N/a     ------- ANESTHESIA RISK-BENEFIT DISCUSSION -------  BENEFITS OF A SPECIALIZED ANESTHESIA TEAM INCLUDE (NBK [id number], PMID 13382370):  (1) Reduce mortality and morbidity for major surgeries/procedures. (2) The team provides analgesia/sedation/amnesia/akinesia as safely as possible. (3) The team strives to reduce discomfort as safely as possible.    RISKS, AND PLANS TO MITIGATE RISKS, INCLUDE:    - Neurologic system: IntraOp awareness (Risk is ~1:1,000 - 1:14,000; PMID 73417625), Stroke (Risk ~<0.1-2% for most cases; PMID 89115302), nerve injury, vision loss, and POCD.     - Airway and Pulmonary system: Dental or mouth injury, throat pain, critical hypoxia, pneumothorax, prolonged intubation, post-op respiratory compromise.  Airway/Intubation risks and prior data:  Ventilated by mask (1); Technique: Direct laryngoscopy, Stylet; Type: Cuffed; Tube Size: 7 mm; Laryngoscope: Mac; Blade Size: 3; Location: Oral; Grade View: 1; Insertion Attempts: 1; Placement Verification: End tidal CO2, Cuff palpitation;  Major ARISCAT risk factors for pulmonary complications include: none, yielding a score of 0-1= Low risk, 1.6%.  - Cardiovascular system: Hypotension, arrhythmias, cardiac injury or arrest, blood clots, bleeding, infection, vascular injuries.  Chris's RCRI score items: none, yielding an RCRI Score of 0= 0.4% risk of MACE  Are bradley-op or intra-op beta blockers indicated? (PMID 23458928): no  - FEN/GI system: Aspiration risk (~0.5% [facility] [id number]) and PONV (10-80% per Apfel score) especially if the patient has not fasted.  ASA NPO guideline compliance?: Yes  - Medication risk assessment: Allergic reactions, excessive bleeding with anticoagulant use, overdoses, drug-drug interactions, injury to a fetus or  in pregnant or breastfeeding patients, bradley-procedural sedation including while driving/operating machinery.  Recent relevant medications: See MAR or Med Review  Personal or family  history of anesthesia complications: no  Pregnancy Status: Negative  - Estimate mortality risks associated with anesthesia based on ASA-PS (PMID 70751537): ASA-PS II: risk 1:20,000

## 2024-07-26 ENCOUNTER — VBI (OUTPATIENT)
Dept: ADMINISTRATIVE | Facility: OTHER | Age: 29
End: 2024-07-26

## 2024-07-26 NOTE — TELEPHONE ENCOUNTER
07/26/24 1:06 PM     Chart reviewed for Pap Smear (HPV) aka Cervical Cancer Screening ; nothing is submitted to the patient's insurance at this time.     [place]   [facility]

## 2024-07-30 ENCOUNTER — ANNUAL EXAM (OUTPATIENT)
Dept: OBGYN CLINIC | Facility: CLINIC | Age: 29
End: 2024-07-30
Payer: COMMERCIAL

## 2024-07-30 VITALS
SYSTOLIC BLOOD PRESSURE: 108 MMHG | WEIGHT: 179 LBS | DIASTOLIC BLOOD PRESSURE: 68 MMHG | HEIGHT: 63 IN | BODY MASS INDEX: 31.71 KG/M2

## 2024-07-30 DIAGNOSIS — Z01.419 ENCOUNTER FOR WELL WOMAN EXAM WITH ROUTINE GYNECOLOGICAL EXAM: Primary | ICD-10-CM

## 2024-07-30 PROCEDURE — S0612 ANNUAL GYNECOLOGICAL EXAMINA: HCPCS | Performed by: STUDENT IN AN ORGANIZED HEALTH CARE EDUCATION/TRAINING PROGRAM

## 2024-07-30 PROCEDURE — G0145 SCR C/V CYTO,THINLAYER,RESCR: HCPCS | Performed by: STUDENT IN AN ORGANIZED HEALTH CARE EDUCATION/TRAINING PROGRAM

## 2024-07-30 NOTE — PROGRESS NOTES
[facility] [address] [place]    ASSESSMENT/PLAN: Merna Zarco is a 28 y.o.  who presents for annual gynecologic exam.    Encounter for routine gynecologic examination  - Routine well woman exam completed today.  - Cervical Cancer Screening: Current ASCCP Guidelines reviewed. Pap done today  - HPV Vaccination status: Immunization series complete  - STI screening offered including HIV testing: Declined  - Contraceptive counseling discussed.  Current contraception: condoms     Additional problems addressed during this visit:  1. Encounter for well woman exam with routine gynecological exam  -     Liquid-based pap, screening      CC:  Annual Gynecologic Examination    HPI: Merna Zarco is a 28 y.o.  who presents for annual gynecologic examination.  She reports  no new changes to her health.  She reports no breast concerns. She gets regular periods. She has no vaginal discharge, vulvar or vaginal lesions, pelvic pain, or abnormal bleeding.  She has no sexual health concerns and is currently sexually active with one male partner.  She contracepts with . She has no symptoms of pelvic organ prolapse, urinary, or fecal incontinence.  She denies intimate partner violence.            The following portions of the patient's history were reviewed and updated as appropriate: allergies, current medications, past family history, past medical history, obstetric history, gynecologic history, past social history, past surgical history and problem list.    Review of Systems   Constitutional:  Negative for chills and fever.   Respiratory:  Negative for cough and shortness of breath.    Cardiovascular:  Negative for chest pain and leg swelling.   Gastrointestinal:  Negative for abdominal pain, nausea and vomiting.   Genitourinary:  Negative for dysuria, frequency and urgency.   Neurological:  Negative for dizziness, light-headedness and headaches.         Objective:  /68    "Ht 5' 3\" (1.6 m)   Wt 81.2 kg (179 lb)   LMP 07/22/2024 (Exact Date)   BMI 31.71 kg/m²    Physical Exam  Chaperone present: chaparone offered, patient declined.   Constitutional:       Appearance: Normal appearance.   HENT:      Head: Normocephalic and atraumatic.   Cardiovascular:      Rate and Rhythm: Normal rate.   Pulmonary:      Effort: Pulmonary effort is normal.   Chest:   Breasts:     Breasts are symmetrical.      Right: Normal. No swelling, bleeding, inverted nipple, mass, nipple discharge, skin change or tenderness.      Left: Normal. No swelling, bleeding, inverted nipple, mass, nipple discharge, skin change or tenderness.   Abdominal:      General: There is no distension.      Tenderness: There is no abdominal tenderness. There is no guarding.   Genitourinary:     Exam position: Lithotomy position.      Pubic Area: No rash.       Labia:         Right: No rash, tenderness or lesion.         Left: No rash, tenderness or lesion.       Urethra: No prolapse, urethral swelling or urethral lesion.      Vagina: Normal. No vaginal discharge, erythema, tenderness, bleeding or lesions.      Cervix: No cervical motion tenderness, discharge, friability or erythema.      Uterus: Not enlarged, not tender and no uterine prolapse.       Adnexa:         Right: No mass, tenderness or fullness.          Left: No mass, tenderness or fullness.     Lymphadenopathy:      Upper Body:      Right upper body: No axillary adenopathy.      Left upper body: No axillary adenopathy.      Lower Body: No right inguinal adenopathy. No left inguinal adenopathy.   Neurological:      Mental Status: She is alert.           Tika Montero MD  [facility]  [facility]  7/30/2024 2:15 PM    "

## 2024-08-09 LAB
LAB AP GYN PRIMARY INTERPRETATION: NORMAL
Lab: NORMAL

## 2024-10-21 ENCOUNTER — OFFICE VISIT (OUTPATIENT)
Dept: FAMILY MEDICINE CLINIC | Facility: CLINIC | Age: 29
End: 2024-10-21
Payer: COMMERCIAL

## 2024-10-21 VITALS
DIASTOLIC BLOOD PRESSURE: 73 MMHG | BODY MASS INDEX: 30.58 KG/M2 | HEART RATE: 77 BPM | OXYGEN SATURATION: 99 % | SYSTOLIC BLOOD PRESSURE: 128 MMHG | WEIGHT: 172.6 LBS | TEMPERATURE: 97.3 F | RESPIRATION RATE: 18 BRPM | HEIGHT: 63 IN

## 2024-10-21 DIAGNOSIS — D50.8 IRON DEFICIENCY ANEMIA SECONDARY TO INADEQUATE DIETARY IRON INTAKE: ICD-10-CM

## 2024-10-21 DIAGNOSIS — E55.9 VITAMIN D DEFICIENCY: ICD-10-CM

## 2024-10-21 DIAGNOSIS — F33.0 MILD EPISODE OF RECURRENT MAJOR DEPRESSIVE DISORDER (HCC): ICD-10-CM

## 2024-10-21 DIAGNOSIS — Z83.3 FAMILY HISTORY OF TYPE 2 DIABETES MELLITUS IN FATHER: ICD-10-CM

## 2024-10-21 DIAGNOSIS — Z00.00 ENCOUNTER FOR WELL ADULT EXAM WITHOUT ABNORMAL FINDINGS: Primary | ICD-10-CM

## 2024-10-21 PROCEDURE — 99395 PREV VISIT EST AGE 18-39: CPT | Performed by: NURSE PRACTITIONER

## 2024-10-21 NOTE — ASSESSMENT & PLAN NOTE
Orders:    TSH, 3rd generation with Free T4 reflex; Future    Iron Panel (Includes Ferritin, Iron Sat%, Iron, and TIBC); Future

## 2024-10-21 NOTE — ASSESSMENT & PLAN NOTE
Depression Screening Follow-up Plan: Patient's depression screening was positive with a PHQ-9 score of 7. Patient assessed for underlying major depression. They have no active suicidal ideations. Brief counseling provided and recommend additional follow-up/re-evaluation next office visit.  Currently in talk therapy with [facility].

## 2024-10-21 NOTE — PROGRESS NOTES
Depression Screening Follow-up Plan: Patient's depression screening was positive with a PHQ-2 score of . Their PHQ-9 score was 7. Patient assessed for underlying major depression. They have no active suicidal ideations. Brief counseling provided and recommend additional follow-up/re-evaluation next office visit.Adult Annual Physical  Name: Merna Zarco      : 1995      MRN: [id number]  Encounter Provider: JITENDRA Mcfadden  Encounter Date: 10/21/2024   Encounter department: [facility]    Assessment & Plan  Encounter for well adult exam without abnormal findings    Orders:    CBC and differential; Future    Comprehensive metabolic panel; Future    Lipid Panel with Direct LDL reflex; Future    Iron deficiency anemia secondary to inadequate dietary iron intake    Orders:    TSH, 3rd generation with Free T4 reflex; Future    Iron Panel (Includes Ferritin, Iron Sat%, Iron, and TIBC); Future    Mild episode of recurrent major depressive disorder (HCC)  Depression Screening Follow-up Plan: Patient's depression screening was positive with a PHQ-9 score of 7. Patient assessed for underlying major depression. They have no active suicidal ideations. Brief counseling provided and recommend additional follow-up/re-evaluation next office visit.  Currently in talk therapy with [facility].          Vitamin D deficiency    Orders:    Vitamin D 25 hydroxy; Future    Family history of type 2 diabetes mellitus in father    Orders:    Hemoglobin A1C; Future    Immunizations and preventive care screenings were discussed with patient today. Appropriate education was printed on patient's after visit summary.    Counseling:  Alcohol/drug use: discussed moderation in alcohol intake, the recommendations for healthy alcohol use, and avoidance of illicit drug use.  Dental Health: discussed importance of regular tooth brushing, flossing, and dental visits.  Injury prevention: discussed safety/seat belts,  safety helmets, smoke detectors, carbon monoxide detectors, and smoking near bedding or upholstery.  Sexual health: discussed sexually transmitted diseases, partner selection, use of condoms, avoidance of unintended pregnancy, and contraceptive alternatives.  Exercise: the importance of regular exercise/physical activity was discussed. Recommend exercise 3-5 times per week for at least 30 minutes.       Depression Screening and Follow-up Plan: Patient's depression screening was positive with a PHQ-9 score of 7. Patient assessed for underlying major depression. Brief counseling provided and recommend additional follow-up/re-evaluation next office visit.         History of Present Illness     Adult Annual Physical:  Patient presents for annual physical.     Diet and Physical Activity:  - Diet/Nutrition: well balanced diet.  - Exercise: vigorous cardiovascular exercise and walking. once week class    Depression Screening:    - PHQ-9 Score: 7    General Health:  - Sleep: 7-8 hours of sleep on average.  - Hearing: normal hearing right ear and normal hearing left ear.  - Vision: no vision problems.  - Dental: regular dental visits.    /GYN Health:  - Follows with GYN: yes.   - Menopause: perimenopausal.   - History of STDs: no    Advanced Care Planning:  - Has an advanced directive?: no    - Has a durable medical POA?: no    - ACP document given to patient?: no      Review of Systems   Constitutional:  Negative for chills, fatigue and fever.   HENT:  Negative for congestion, ear discharge, ear pain, sore throat, trouble swallowing and voice change.    Eyes:  Negative for pain and redness.   Respiratory:  Negative for cough, chest tightness, shortness of breath and wheezing.    Gastrointestinal:  Negative for abdominal pain, blood in stool, constipation, diarrhea, nausea and vomiting.   Endocrine: Negative for cold intolerance, heat intolerance, polydipsia, polyphagia and polyuria.   Genitourinary:  Negative for  "decreased urine volume, dysuria, frequency and urgency.   Musculoskeletal:  Negative for arthralgias, back pain, myalgias and neck pain.   Skin:  Negative for color change and rash.   Neurological:  Negative for dizziness, syncope, weakness, light-headedness, numbness and headaches.   Psychiatric/Behavioral:  Negative for sleep disturbance and suicidal ideas. The patient is not nervous/anxious.          Objective     /73 (BP Location: Left arm, Patient Position: Sitting, Cuff Size: Standard)   Pulse 77   Temp (!) 97.3 °F (36.3 °C) (Tympanic)   Resp 18   Ht 5' 3\" (1.6 m)   Wt 78.3 kg (172 lb 9.6 oz)   SpO2 99%   BMI 30.57 kg/m²     Physical Exam  Vitals and nursing note reviewed.   Constitutional:       Appearance: Normal appearance.   HENT:      Head: Normocephalic and atraumatic.      Right Ear: Tympanic membrane, ear canal and external ear normal.      Left Ear: Tympanic membrane, ear canal and external ear normal.      Nose: Nose normal.      Mouth/Throat:      Mouth: Mucous membranes are moist.   Eyes:      Pupils: Pupils are equal, round, and reactive to light.   Cardiovascular:      Rate and Rhythm: Normal rate and regular rhythm.      Pulses: Normal pulses.      Heart sounds: Normal heart sounds.   Pulmonary:      Effort: Pulmonary effort is normal.      Breath sounds: Normal breath sounds.   Abdominal:      Palpations: Abdomen is soft.      Hernia: No hernia is present.   Musculoskeletal:         General: Normal range of motion.      Cervical back: Normal range of motion and neck supple.   Skin:     General: Skin is warm.   Neurological:      General: No focal deficit present.      Mental Status: She is alert and oriented to person, place, and time.   Psychiatric:         Mood and Affect: Mood normal.         Behavior: Behavior normal.         Thought Content: Thought content normal.         Judgment: Judgment normal.         "

## 2024-10-23 ENCOUNTER — APPOINTMENT (OUTPATIENT)
Dept: LAB | Facility: CLINIC | Age: 29
End: 2024-10-23
Payer: COMMERCIAL

## 2024-10-23 DIAGNOSIS — Z83.3 FAMILY HISTORY OF TYPE 2 DIABETES MELLITUS IN FATHER: ICD-10-CM

## 2024-10-23 DIAGNOSIS — D50.8 IRON DEFICIENCY ANEMIA SECONDARY TO INADEQUATE DIETARY IRON INTAKE: ICD-10-CM

## 2024-10-23 DIAGNOSIS — Z00.00 ENCOUNTER FOR WELL ADULT EXAM WITHOUT ABNORMAL FINDINGS: ICD-10-CM

## 2024-10-23 DIAGNOSIS — E55.9 VITAMIN D DEFICIENCY: ICD-10-CM

## 2024-10-23 LAB
25(OH)D3 SERPL-MCNC: 31.3 NG/ML (ref 30–100)
ALBUMIN SERPL BCG-MCNC: 4.3 G/DL (ref 3.5–5)
ALP SERPL-CCNC: 52 U/L (ref 34–104)
ALT SERPL W P-5'-P-CCNC: 22 U/L (ref 7–52)
ANION GAP SERPL CALCULATED.3IONS-SCNC: 9 MMOL/L (ref 4–13)
AST SERPL W P-5'-P-CCNC: 22 U/L (ref 13–39)
BASOPHILS # BLD AUTO: 0.04 THOUSANDS/ΜL (ref 0–0.1)
BASOPHILS NFR BLD AUTO: 1 % (ref 0–1)
BILIRUB SERPL-MCNC: 0.81 MG/DL (ref 0.2–1)
BUN SERPL-MCNC: 13 MG/DL (ref 5–25)
CALCIUM SERPL-MCNC: 9.7 MG/DL (ref 8.4–10.2)
CHLORIDE SERPL-SCNC: 103 MMOL/L (ref 96–108)
CHOLEST SERPL-MCNC: 140 MG/DL
CO2 SERPL-SCNC: 27 MMOL/L (ref 21–32)
CREAT SERPL-MCNC: 0.84 MG/DL (ref 0.6–1.3)
EOSINOPHIL # BLD AUTO: 0.12 THOUSAND/ΜL (ref 0–0.61)
EOSINOPHIL NFR BLD AUTO: 2 % (ref 0–6)
ERYTHROCYTE [DISTWIDTH] IN BLOOD BY AUTOMATED COUNT: 11.9 % (ref 11.6–15.1)
EST. AVERAGE GLUCOSE BLD GHB EST-MCNC: 103 MG/DL
FERRITIN SERPL-MCNC: 39 NG/ML (ref 11–307)
GFR SERPL CREATININE-BSD FRML MDRD: 94 ML/MIN/1.73SQ M
GLUCOSE P FAST SERPL-MCNC: 87 MG/DL (ref 65–99)
HBA1C MFR BLD: 5.2 %
HCT VFR BLD AUTO: 39.3 % (ref 34.8–46.1)
HDLC SERPL-MCNC: 52 MG/DL
HGB BLD-MCNC: 13 G/DL (ref 11.5–15.4)
IMM GRANULOCYTES # BLD AUTO: 0.02 THOUSAND/UL (ref 0–0.2)
IMM GRANULOCYTES NFR BLD AUTO: 0 % (ref 0–2)
IRON SATN MFR SERPL: 48 % (ref 15–50)
IRON SERPL-MCNC: 160 UG/DL (ref 50–212)
LDLC SERPL CALC-MCNC: 75 MG/DL (ref 0–100)
LYMPHOCYTES # BLD AUTO: 1.83 THOUSANDS/ΜL (ref 0.6–4.47)
LYMPHOCYTES NFR BLD AUTO: 27 % (ref 14–44)
MCH RBC QN AUTO: 30.2 PG (ref 26.8–34.3)
MCHC RBC AUTO-ENTMCNC: 33.1 G/DL (ref 31.4–37.4)
MCV RBC AUTO: 91 FL (ref 82–98)
MONOCYTES # BLD AUTO: 0.63 THOUSAND/ΜL (ref 0.17–1.22)
MONOCYTES NFR BLD AUTO: 9 % (ref 4–12)
NEUTROPHILS # BLD AUTO: 4.15 THOUSANDS/ΜL (ref 1.85–7.62)
NEUTS SEG NFR BLD AUTO: 61 % (ref 43–75)
NRBC BLD AUTO-RTO: 0 /100 WBCS
PLATELET # BLD AUTO: 297 THOUSANDS/UL (ref 149–390)
PMV BLD AUTO: 9.8 FL (ref 8.9–12.7)
POTASSIUM SERPL-SCNC: 4 MMOL/L (ref 3.5–5.3)
PROT SERPL-MCNC: 7.2 G/DL (ref 6.4–8.4)
RBC # BLD AUTO: 4.3 MILLION/UL (ref 3.81–5.12)
SODIUM SERPL-SCNC: 139 MMOL/L (ref 135–147)
TIBC SERPL-MCNC: 331 UG/DL (ref 250–450)
TRIGL SERPL-MCNC: 67 MG/DL
TSH SERPL DL<=0.05 MIU/L-ACNC: 1.25 UIU/ML (ref 0.45–4.5)
UIBC SERPL-MCNC: 171 UG/DL (ref 155–355)
WBC # BLD AUTO: 6.79 THOUSAND/UL (ref 4.31–10.16)

## 2024-10-23 PROCEDURE — 80061 LIPID PANEL: CPT

## 2024-10-23 PROCEDURE — 84443 ASSAY THYROID STIM HORMONE: CPT

## 2024-10-23 PROCEDURE — 82728 ASSAY OF FERRITIN: CPT

## 2024-10-23 PROCEDURE — 83550 IRON BINDING TEST: CPT

## 2024-10-23 PROCEDURE — 36415 COLL VENOUS BLD VENIPUNCTURE: CPT

## 2024-10-23 PROCEDURE — 85025 COMPLETE CBC W/AUTO DIFF WBC: CPT

## 2024-10-23 PROCEDURE — 83036 HEMOGLOBIN GLYCOSYLATED A1C: CPT

## 2024-10-23 PROCEDURE — 83540 ASSAY OF IRON: CPT

## 2024-10-23 PROCEDURE — 80053 COMPREHEN METABOLIC PANEL: CPT

## 2024-10-23 PROCEDURE — 82306 VITAMIN D 25 HYDROXY: CPT

## 2025-01-13 ENCOUNTER — OFFICE VISIT (OUTPATIENT)
Dept: URGENT CARE | Facility: CLINIC | Age: 30
End: 2025-01-13
Payer: COMMERCIAL

## 2025-01-13 VITALS
HEART RATE: 100 BPM | SYSTOLIC BLOOD PRESSURE: 124 MMHG | OXYGEN SATURATION: 100 % | RESPIRATION RATE: 16 BRPM | TEMPERATURE: 98.8 F | DIASTOLIC BLOOD PRESSURE: 60 MMHG

## 2025-01-13 DIAGNOSIS — J11.1 INFLUENZA-LIKE ILLNESS: Primary | ICD-10-CM

## 2025-01-13 PROCEDURE — 99214 OFFICE O/P EST MOD 30 MIN: CPT | Performed by: NURSE PRACTITIONER

## 2025-01-13 PROCEDURE — 87636 SARSCOV2 & INF A&B AMP PRB: CPT | Performed by: NURSE PRACTITIONER

## 2025-01-13 RX ORDER — OSELTAMIVIR PHOSPHATE 75 MG/1
75 CAPSULE ORAL EVERY 12 HOURS SCHEDULED
Qty: 10 CAPSULE | Refills: 0 | Status: SHIPPED | OUTPATIENT
Start: 2025-01-13 | End: 2025-01-18

## 2025-01-13 NOTE — PROGRESS NOTES
[facility]        NAME: Merna Zarco is a 29 y.o. female  : 1995    MRN: [id number]  DATE: 2025  TIME: 12:53 PM      Assessment and Plan     Influenza-like illness [J11.1]  1. Influenza-like illness  oseltamivir (TAMIFLU) 75 mg capsule    Covid/Flu- Office Collect Normal            Patient Instructions   There are no Patient Instructions on file for this visit.    Follow up with PCP in 3-5 days.  Proceed to  ER if symptoms worsen.    Chief Complaint     Chief Complaint   Patient presents with    Fever     Fever, cough, body aches, started Last night.  has the Flu         History of Present Illness      currently has flu-like symptoms; covid/flu swab obtained yesterday still pending.  His work has multiple flu cases right now.  Patient presents w/ abrupt onset of s/s illness last night--cough starting last night and rest of symptoms hit hard this morning.  Has been taking emergenC immune boost.        Review of Systems     Review of Systems   Constitutional:  Positive for fever.   HENT:  Positive for congestion (little bit), postnasal drip and sore throat (scratchy).    Respiratory:  Positive for cough.    Gastrointestinal:  Negative for nausea and vomiting.   Musculoskeletal:  Positive for myalgias.   All other systems reviewed and are negative.        Current Medications       Current Outpatient Medications:     oseltamivir (TAMIFLU) 75 mg capsule, Take 1 capsule (75 mg total) by mouth every 12 (twelve) hours for 5 days, Disp: 10 capsule, Rfl: 0    Current Allergies     Allergies as of 2025 - Reviewed 2025   Allergen Reaction Noted    Amoxicillin Hives 2015    Cefaclor Hives 2015    Penicillin v Hives 2015              The following portions of the patient's history were reviewed and updated as appropriate: allergies, current medications, past family history, past medical history, past social history, past surgical history and problem list.      Past Medical History:   Diagnosis Date    Anxiety     Contraception     Diarrhea     Ear piercing     GERD (gastroesophageal reflux disease)     IUD contraception 01/30/2020    Kyle[place]    Sleep related teeth grinding        Past Surgical History:   Procedure Laterality Date    APPENDECTOMY  5/27/22    APPENDECTOMY LAPAROSCOPIC N/A 05/27/2022    Procedure: APPENDECTOMY LAPAROSCOPIC;  Surgeon: Chely Tapia MD;  Location: [facility];  Service: General    COLONOSCOPY N/A 03/30/2018    Procedure: COLONOSCOPY;  Surgeon: Ralf Purdy MD;  Location: [facility] GI LAB;  Service: Gastroenterology    ESOPHAGOGASTRODUODENOSCOPY      MS HYSTEROSCOPY DIAGNOSTIC SEPARATE PROCEDURE N/A 7/9/2024    Procedure: HYSTEROSCOPY FOR IUD REMOVAL, EXAM UNDER ANESTHESIA;  Surgeon: Tika Montero MD;  Location: [facility];  Service: Gynecology    MS REMOVAL INTRAUTERINE DEVICE IUD N/A 7/9/2024    Procedure: REMOVAL OF INTRAUTERINE DEVICE (IUD);  Surgeon: Tika Montero MD;  Location: [facility];  Service: Gynecology    WISDOM TOOTH EXTRACTION         Family History   Problem Relation Age of Onset    No Known Problems Mother     Obesity Father     Diabetes Father     No Known Problems Brother     No Known Problems Brother     Pancreatic cancer Maternal Grandmother     Cancer Maternal Grandmother         Pancreatic    Stroke Maternal Grandfather     Obesity Paternal Grandmother     Congenital heart disease Paternal Grandmother     Diabetes Paternal Grandmother     Diabetes Paternal Grandfather     Diabetes Family          Medications have been verified.        Objective     /60   Pulse 100   Temp 98.8 °F (37.1 °C)   Resp 16   SpO2 100%   No LMP recorded.         Physical Exam     Physical Exam  Vitals and nursing note reviewed.   Constitutional:       General: She is not in acute distress.     Appearance: Normal appearance. She is well-developed and well-groomed. She is ill-appearing. She is not toxic-appearing or  diaphoretic.   HENT:      Head: Normocephalic and atraumatic.      Right Ear: Hearing, tympanic membrane, ear canal and external ear normal.      Left Ear: Hearing, tympanic membrane, ear canal and external ear normal.      Nose: Congestion (slight) present.      Mouth/Throat:      Mouth: Mucous membranes are moist.      Pharynx: Uvula midline. Posterior oropharyngeal erythema (slight; irritated) present. No oropharyngeal exudate.      Tonsils: No tonsillar exudate or tonsillar abscesses.   Eyes:      Pupils: Pupils are equal, round, and reactive to light.   Cardiovascular:      Rate and Rhythm: Normal rate and regular rhythm. No extrasystoles are present.     Heart sounds: Normal heart sounds, S1 normal and S2 normal. No murmur heard.     No friction rub. No gallop.   Pulmonary:      Effort: Pulmonary effort is normal. No tachypnea, bradypnea, accessory muscle usage, prolonged expiration, respiratory distress or retractions.      Breath sounds: Normal breath sounds and air entry. No stridor or decreased air movement. No decreased breath sounds, wheezing, rhonchi or rales.   Abdominal:      General: Bowel sounds are normal. There is no distension.      Palpations: Abdomen is soft.      Tenderness: There is no abdominal tenderness. There is no guarding or rebound.   Musculoskeletal:         General: Normal range of motion.      Cervical back: Normal range of motion and neck supple.   Skin:     General: Skin is warm and dry.      Capillary Refill: Capillary refill takes less than 2 seconds.   Neurological:      General: No focal deficit present.      Mental Status: She is alert and oriented to person, place, and time.   Psychiatric:         Mood and Affect: Mood normal.         Behavior: Behavior normal. Behavior is cooperative.         Thought Content: Thought content normal.         Judgment: Judgment normal.

## 2025-01-14 ENCOUNTER — RESULTS FOLLOW-UP (OUTPATIENT)
Dept: URGENT CARE | Facility: CLINIC | Age: 30
End: 2025-01-14

## 2025-01-14 LAB
FLUAV RNA RESP QL NAA+PROBE: POSITIVE
FLUBV RNA RESP QL NAA+PROBE: NEGATIVE
SARS-COV-2 RNA RESP QL NAA+PROBE: NEGATIVE

## 2025-04-15 ENCOUNTER — ULTRASOUND (OUTPATIENT)
Dept: OBGYN CLINIC | Facility: CLINIC | Age: 30
End: 2025-04-15
Payer: COMMERCIAL

## 2025-04-15 VITALS
HEIGHT: 63 IN | WEIGHT: 174 LBS | DIASTOLIC BLOOD PRESSURE: 82 MMHG | SYSTOLIC BLOOD PRESSURE: 140 MMHG | BODY MASS INDEX: 30.83 KG/M2

## 2025-04-15 DIAGNOSIS — Z34.01 NORMAL FIRST PREGNANCY CONFIRMED, CURRENTLY IN FIRST TRIMESTER: Primary | ICD-10-CM

## 2025-04-15 PROCEDURE — 99213 OFFICE O/P EST LOW 20 MIN: CPT | Performed by: PHYSICIAN ASSISTANT

## 2025-04-15 PROCEDURE — 76817 TRANSVAGINAL US OBSTETRIC: CPT | Performed by: PHYSICIAN ASSISTANT

## 2025-04-15 RX ORDER — CALCIUM CARBONATE 500 MG/1
1 TABLET, CHEWABLE ORAL DAILY
COMMUNITY

## 2025-04-15 NOTE — PROGRESS NOTES
Assessment/Plan:  - Viable IUP @ 8w 5d EGA  - MINDA 25  - Continue PNV  - Patient to call for concerns  - RTO 3 weeks for OB intake    Encounter Diagnosis     ICD-10-CM    1. Normal first pregnancy confirmed, currently in first trimester  Z34.01 Ambulatory Referral to Maternal Fetal Medicine     [facility] OB < 14 weeks single or first gestation level 1              Subjective:       Patient ID: Merna Zarco 1995        Merna Zarco is a 29 y.o.  presenting to the office for pregnancy confirmation. Patient's last menstrual period was 2025 (exact date). , placing her at 8w5d today with MINDA of 25. She is feeling ok today        OB History    Para Term  AB Living   1 0 0 0 0 0   SAB IAB Ectopic Multiple Live Births   0 0 0 0 0      # Outcome Date GA Lbr David/2nd Weight Sex Type Anes PTL Lv   1 Current                  The following portions of the patient's history were reviewed and updated as appropriate: She  has a past medical history of Anxiety, Contraception, Diarrhea, Ear piercing, GERD (gastroesophageal reflux disease), IUD contraception (2020), and Sleep related teeth grinding.  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  Her family history includes Cancer in her maternal grandmother; Congenital heart disease in her paternal grandmother; Diabetes in her family, father, paternal grandfather, and paternal grandmother; No Known Problems in her brother, brother, and mother; Obesity in her father and paternal grandmother; Pancreatic cancer in her maternal grandmother; Stroke in her maternal grandfather.  She  reports that she quit smoking about 3 years ago. Her smoking use included cigarettes. She started smoking about 5 years ago. She has a 0.2 pack-year smoking history. She has never used smokeless tobacco. She reports current alcohol use. She reports current drug use. Drug: Marijuana.  Current Outpatient Medications   Medication Sig Dispense Refill    calcium carbonate (TUMS) 500 mg chewable tablet Chew 1 tablet daily      Prenatal MV-Min-Fe Fum-FA-DHA (PRENATAL 1 PO) Take 1 tablet by mouth in the morning       No current facility-administered medications for this visit.     Current Outpatient Medications on File Prior to Visit   Medication Sig    calcium carbonate (TUMS) 500 mg chewable tablet Chew 1 tablet daily    Prenatal MV-Min-Fe Fum-FA-DHA (PRENATAL 1 PO) Take 1 tablet by mouth in the morning     No current facility-administered medications on file prior to visit.     She is allergic to amoxicillin, cefaclor, and penicillin v..    Allergies:  Amoxicillin, Cefaclor, and Penicillin v    Medications:    Current Outpatient Medications:     calcium carbonate (TUMS) 500 mg chewable tablet, Chew 1 tablet daily, Disp: , Rfl:     Prenatal MV-Min-Fe Fum-FA-DHA (PRENATAL 1 PO), Take 1 tablet by mouth in the morning, Disp: , Rfl:       Review of  "Systems:   Review of Systems   Gastrointestinal:  Negative for abdominal pain and nausea.   Genitourinary:  Negative for vaginal bleeding.          Objective:       Visit Vitals  /82   Ht 5' 3\" (1.6 m)   Wt 78.9 kg (174 lb)   LMP 02/13/2025 (Exact Date)   BMI 30.82 kg/m²   OB Status Pregnant   Smoking Status Former   BSA 1.82 m²        GEN: The patient was alert and oriented x3, pleasant well-appearing female in no acute distress.   PULM: nonlabored respirations  MSK: Normal gait  : WNL  Skin: warm, dry  Neuro: no focal deficits  Psych: normal affect and judgement, cooperative    Ultrasound:     Viability US     Gestational sac: present               Location: intrauterine  Yolk sac: present  Fetal pole: present               CRL: 2.42 cm = 9w1d  Cardiac activity: present               Rate: 183 bpm     Ovaries: normal appearing bilaterally  Cul de sac: absence of free fluid  Uterus: normal in appearance           Ultrasound Probe Disinfection    A transvaginal ultrasound was performed.   Prior to use, disinfection was performed with High Level Disinfection Process (Trophon)  Probe serial number RVRSDE: [id number] was used    Rebekah Hanks PA-C  04/15/25  2:16 PM    "

## 2025-04-29 NOTE — PATIENT INSTRUCTIONS
.Congratulations!! Please review our Pregnancy Essential Guide and [facility] L&D Virtual tour from our networks website.     [facility] Pregnancy Essentials Guide  [facility] ([email or web address])     [facility] (EventBug)

## 2025-04-30 ENCOUNTER — INITIAL PRENATAL (OUTPATIENT)
Dept: OBGYN CLINIC | Facility: CLINIC | Age: 30
End: 2025-04-30

## 2025-04-30 VITALS
HEIGHT: 63 IN | WEIGHT: 176 LBS | BODY MASS INDEX: 31.18 KG/M2 | DIASTOLIC BLOOD PRESSURE: 72 MMHG | SYSTOLIC BLOOD PRESSURE: 124 MMHG

## 2025-04-30 DIAGNOSIS — Z31.430 ENCOUNTER OF FEMALE FOR TESTING FOR GENETIC DISEASE CARRIER STATUS FOR PROCREATIVE MANAGEMENT: ICD-10-CM

## 2025-04-30 DIAGNOSIS — Z36.9 ENCOUNTER FOR ANTENATAL SCREENING: Primary | ICD-10-CM

## 2025-04-30 DIAGNOSIS — Z34.01 ENCOUNTER FOR SUPERVISION OF NORMAL FIRST PREGNANCY IN FIRST TRIMESTER: ICD-10-CM

## 2025-04-30 DIAGNOSIS — Z83.3 FAMILY HISTORY OF DIABETES MELLITUS: ICD-10-CM

## 2025-04-30 RX ORDER — FAMOTIDINE 20 MG/1
20 TABLET, FILM COATED ORAL 2 TIMES DAILY
COMMUNITY

## 2025-04-30 NOTE — PROGRESS NOTES
.  OB INTAKE INTERVIEW  Patient is 29 y.o. who presents for OB intake at  10.6 wks  She is accompanied by  spouse  during this encounter  The father of her baby (Kali ) is involved in the pregnancy and is 28 years old.      Last Menstrual Period: 2025  Ultrasound: Measured 9 weeks 0 days on 04/15/2025  Estimated Date of Delivery: 2025  confirmed by LMP     Signs/Symptoms of Pregnancy  Current pregnancy symptoms:  heartburn, beast tenderness, fatigue  Constipation no  Headaches no  Cramping/spotting YES occasional cramping   PICA cravings no    Diabetes-  Body mass index is 31.18 kg/m².  If patient has 1 or more, please order early 1 hour GTT  History of GDM no  BMI >35 no  History of PCOS or current metformin use (should stop for 7 days prior to 1hr GTT unless pre-existing diabetes)  no  History of LGA/macrosomic infant (4000g/9lbs) no    If patient has 2 or more, please order early 1 hour GTT  BMI>30 YES  AMA no  First degree relative with type 2 diabetes YES Pt dad   History of chronic HTN, hyperlipidemia, elevated A1C no  High risk race (, , ,  or ) no    Hypertension- if you answer yes to any of the following, please order baseline preeclampsia labs (cbc, comprehensive metabolic panel, urine protein creatinine ratio, uric acid)  History of of chronic HTN no  History of gestational HTN no  History of preeclampsia, eclampsia, or HELLP syndrome no  History of diabetes no  History of lupus,sjogrens syndrome, kidney disease no    Thyroid- if yes order TSH with reflex T4  History of thyroid disease no    Bleeding Disorder or Hx of DVT-patient or first degree relative with history of. Order the following if not done previously.   (Factor V, antithrombin III, prothrombin gene mutation, protein C and S Ag, lupus anticoagulant, anticardiolipin, beta-2 glycoprotein)   no    OB/GYN-  History of abnormal pap smear no       Date of last pap smear  2024  History of HPV no  History of Herpes/HSV no  History of other STI (gonorrhea, chlamydia, trich) no  History of prior  no  History of prior  no  History of  delivery prior to 36 weeks 6 days no  History of Varicella or Vaccination  Had pox as a child, and shingles age 18   History of blood transfusion no  Ok for blood transfusion  yes    Substance screening-   History of tobacco use YES  Currently using tobacco no  Substance Use Screen Level/A    MRSA Screening-   Does the pt have a hx of MRSA? no    Immunizations:  Influenza vaccine given this season  no  Discussed Tdap vaccine yes  Discussed COVID Vaccine yes    Genetic/[facility]-  Do you or your partner have a history of any of the following in yourselves or first degree relatives?  Cystic fibrosis no  Spinal muscular atrophy no  Hemoglobinopathy/Sickle Cell/Thalassemia no  Fragile X Intellectual Disability no        discussed Carrier Screening being completed once in a lifetime as a standard of care lab test.  CF,sma, cascade  ordered    Appointment for Nuchal Translucency Ultrasound at [facility] scheduled for  2025      Interview education  [facility] Pregnancy Essentials Book reviewed, discussed and attached to their AVS yes    Nurse/Family Partnership- patient may qualify  no referral placed  no    Prenatal lab work scripts  yes  Extra labs ordered:   Cf,sma cascade, varicella , 1 hr gtt    Aspirin/Preeclampsia Screen    Risk Level Risk Factor Recommendation   LOW Prior Uncomplicated full-term delivery YES No Aspirin recommendation        MODERATE Nulliparity YES Recommend low-dose aspirin if     BMI>30 YES 2 or more moderate risk factors    Family History Preeclampsia (mother/sister) no     35yr old or greater no     Black Race, Concern for SDOH/Low Socioeconomic no     IVF Pregnancy  no     Personal History Risks (low birth weight, prior adverse preg outcome, >10yr preg interval) no         HIGH History of Preeclampsia no Recommend  low-dose aspirin if     Multifetal gestation no 1 or more high risk factors    Chronic HTN no     Type 1 or 2 Diabetes no     Renal Disease no     Autoimmune Disease  no      Contraindications to ASA therapy:  NSAID/ ASA allergy: no  Nasal polyps: no  Asthma with history of ASA induced bronchospasm: no  Relative contraindications:  History of GI bleed: no  Active peptic ulcer disease: no  Severe hepatic dysfunction: no    Patient should be recommended to take ASA 162mg during this pregnancy from 12-36wks to lower her risk of preeclampsia:  discussed           The patient has a history now or in prior pregnancy notable for:  tobacco use      Details that I feel the provider should be aware of: .This is a planned and welcomed pregnancy for parents. Patient is feeling generally well. Patient is experiencing someheartburn, beast tenderness, fatigue and occasional cramping .This is their first child for Merna farhat Kali . Patient has a past medical history of depression and anxiety (no medication) she has GERD and Vitamin D deficiency , she is a former smoker and has a history of Marijuana ( Had medical Card ). Patient BMI is 31.18. A 1 hr gtt was ordered . Patient mom has a history of Hypertension and Vertigo. Her Father has a history of Type II diabetes ,obesity and ulcerative colitis. Patient is of [place] Descent and a [facility] was ordered .OTC medications and diet were discussed. Patient knows to call OB for symptoms and how to contact her nurse navigator. Patient was made aware to have lab orders completed before next appointment. Patient denies any questions at this point and verbalizes understanding.PN1 visit scheduled. The patient was oriented to our practice, the navigator role, reviewed delivering physicians and [facility] for Delivery. All questions were answered.    Interviewed by: .Electronically Signed by Lauryn Short LPN , OB Nurse Navigator

## 2025-05-02 ENCOUNTER — APPOINTMENT (OUTPATIENT)
Dept: LAB | Facility: CLINIC | Age: 30
End: 2025-05-02
Attending: OBSTETRICS & GYNECOLOGY
Payer: COMMERCIAL

## 2025-05-02 DIAGNOSIS — Z83.3 FAMILY HISTORY OF DIABETES MELLITUS: ICD-10-CM

## 2025-05-02 DIAGNOSIS — Z36.9 ENCOUNTER FOR ANTENATAL SCREENING: ICD-10-CM

## 2025-05-02 DIAGNOSIS — Z34.01 ENCOUNTER FOR SUPERVISION OF NORMAL FIRST PREGNANCY IN FIRST TRIMESTER: ICD-10-CM

## 2025-05-02 DIAGNOSIS — Z31.430 ENCOUNTER OF FEMALE FOR TESTING FOR GENETIC DISEASE CARRIER STATUS FOR PROCREATIVE MANAGEMENT: ICD-10-CM

## 2025-05-02 LAB
ABO GROUP BLD: NORMAL
AMORPH URATE CRY URNS QL MICRO: ABNORMAL
BACTERIA UR QL AUTO: ABNORMAL /HPF
BASOPHILS # BLD AUTO: 0.03 THOUSANDS/ÂΜL (ref 0–0.1)
BASOPHILS NFR BLD AUTO: 0 % (ref 0–1)
BILIRUB UR QL STRIP: NEGATIVE
BLD GP AB SCN SERPL QL: NEGATIVE
CLARITY UR: ABNORMAL
COLOR UR: ABNORMAL
EOSINOPHIL # BLD AUTO: 0 THOUSAND/ÂΜL (ref 0–0.61)
EOSINOPHIL NFR BLD AUTO: 0 % (ref 0–6)
ERYTHROCYTE [DISTWIDTH] IN BLOOD BY AUTOMATED COUNT: 12.1 % (ref 11.6–15.1)
GLUCOSE 1H P 50 G GLC PO SERPL-MCNC: 116 MG/DL (ref 70–134)
GLUCOSE UR STRIP-MCNC: NEGATIVE MG/DL
HBV SURFACE AB SER-ACNC: 8.87 MIU/ML
HBV SURFACE AG SER QL: NORMAL
HCT VFR BLD AUTO: 37.5 % (ref 34.8–46.1)
HCV AB SER QL: NORMAL
HGB BLD-MCNC: 12.4 G/DL (ref 11.5–15.4)
HGB UR QL STRIP.AUTO: NEGATIVE
HIV 1+2 AB+HIV1 P24 AG SERPL QL IA: NORMAL
IMM GRANULOCYTES # BLD AUTO: 0.06 THOUSAND/UL (ref 0–0.2)
IMM GRANULOCYTES NFR BLD AUTO: 1 % (ref 0–2)
KETONES UR STRIP-MCNC: NEGATIVE MG/DL
LEUKOCYTE ESTERASE UR QL STRIP: ABNORMAL
LYMPHOCYTES # BLD AUTO: 1.45 THOUSANDS/ÂΜL (ref 0.6–4.47)
LYMPHOCYTES NFR BLD AUTO: 19 % (ref 14–44)
MCH RBC QN AUTO: 30.2 PG (ref 26.8–34.3)
MCHC RBC AUTO-ENTMCNC: 33.1 G/DL (ref 31.4–37.4)
MCV RBC AUTO: 91 FL (ref 82–98)
MONOCYTES # BLD AUTO: 0.56 THOUSAND/ÂΜL (ref 0.17–1.22)
MONOCYTES NFR BLD AUTO: 7 % (ref 4–12)
MUCOUS THREADS UR QL AUTO: ABNORMAL
NEUTROPHILS # BLD AUTO: 5.67 THOUSANDS/ÂΜL (ref 1.85–7.62)
NEUTS SEG NFR BLD AUTO: 73 % (ref 43–75)
NITRITE UR QL STRIP: NEGATIVE
NON-SQ EPI CELLS URNS QL MICRO: ABNORMAL /HPF
NRBC BLD AUTO-RTO: 0 /100 WBCS
PH UR STRIP.AUTO: 7 [PH]
PLATELET # BLD AUTO: 272 THOUSANDS/UL (ref 149–390)
PMV BLD AUTO: 9.5 FL (ref 8.9–12.7)
PROT UR STRIP-MCNC: NEGATIVE MG/DL
RBC # BLD AUTO: 4.11 MILLION/UL (ref 3.81–5.12)
RBC #/AREA URNS AUTO: ABNORMAL /HPF
RH BLD: POSITIVE
RUBV IGG SERPL IA-ACNC: 41.9 IU/ML
SP GR UR STRIP.AUTO: 1.02 (ref 1–1.03)
SPECIMEN EXPIRATION DATE: NORMAL
TREPONEMA PALLIDUM IGG+IGM AB [PRESENCE] IN SERUM OR PLASMA BY IMMUNOASSAY: NORMAL
UROBILINOGEN UR STRIP-ACNC: <2 MG/DL
WBC # BLD AUTO: 7.77 THOUSAND/UL (ref 4.31–10.16)
WBC #/AREA URNS AUTO: ABNORMAL /HPF

## 2025-05-02 PROCEDURE — 87147 CULTURE TYPE IMMUNOLOGIC: CPT

## 2025-05-02 PROCEDURE — 86901 BLOOD TYPING SEROLOGIC RH(D): CPT

## 2025-05-02 PROCEDURE — 87086 URINE CULTURE/COLONY COUNT: CPT

## 2025-05-02 PROCEDURE — 85025 COMPLETE CBC W/AUTO DIFF WBC: CPT

## 2025-05-02 PROCEDURE — 86850 RBC ANTIBODY SCREEN: CPT

## 2025-05-02 PROCEDURE — 86780 TREPONEMA PALLIDUM: CPT

## 2025-05-02 PROCEDURE — 82950 GLUCOSE TEST: CPT

## 2025-05-02 PROCEDURE — 83020 HEMOGLOBIN ELECTROPHORESIS: CPT

## 2025-05-02 PROCEDURE — 86787 VARICELLA-ZOSTER ANTIBODY: CPT

## 2025-05-02 PROCEDURE — 87389 HIV-1 AG W/HIV-1&-2 AB AG IA: CPT

## 2025-05-02 PROCEDURE — 81001 URINALYSIS AUTO W/SCOPE: CPT

## 2025-05-02 PROCEDURE — 36415 COLL VENOUS BLD VENIPUNCTURE: CPT

## 2025-05-02 PROCEDURE — 86762 RUBELLA ANTIBODY: CPT

## 2025-05-02 PROCEDURE — 86803 HEPATITIS C AB TEST: CPT

## 2025-05-02 PROCEDURE — 86706 HEP B SURFACE ANTIBODY: CPT

## 2025-05-02 PROCEDURE — 86900 BLOOD TYPING SEROLOGIC ABO: CPT

## 2025-05-02 PROCEDURE — 87340 HEPATITIS B SURFACE AG IA: CPT

## 2025-05-03 LAB
BACTERIA UR CULT: ABNORMAL
VZV IGG SER QL IA: 7.54 S/CO
VZV IGG SER QL IA: POSITIVE

## 2025-05-05 ENCOUNTER — RESULTS FOLLOW-UP (OUTPATIENT)
Dept: OBGYN CLINIC | Facility: CLINIC | Age: 30
End: 2025-05-05

## 2025-05-06 LAB
HGB A MFR BLD: 2.6 % (ref 1.8–3.2)
HGB A MFR BLD: 97.4 % (ref 96.4–98.8)
HGB F MFR BLD: 0 % (ref 0–2)
HGB FRACT BLD-IMP: NORMAL
HGB S MFR BLD: 0 %

## 2025-05-07 LAB
CITATION REF LAB TEST: NORMAL
CLINICAL INFO: NORMAL
ETHNIC BACKGROUND STATED: NORMAL
GENE DIS ANL CARRIER INTERP-IMP: NORMAL
GENE MUT TESTED BLD/T: NORMAL
LAB DIRECTOR NAME PROVIDER: NORMAL
REASON FOR REFERRAL (NARRATIVE): NORMAL
RECOMMENDATION PATIENT DOC-IMP: NORMAL
REF LAB TEST METHOD: NORMAL
SERVICE CMNT-IMP: NORMAL
SMN1 GENE MUT ANL BLD/T: NORMAL
SPECIMEN SOURCE: NORMAL

## 2025-05-12 ENCOUNTER — INITIAL PRENATAL (OUTPATIENT)
Dept: OBGYN CLINIC | Facility: CLINIC | Age: 30
End: 2025-05-12

## 2025-05-12 ENCOUNTER — ROUTINE PRENATAL (OUTPATIENT)
Facility: HOSPITAL | Age: 30
End: 2025-05-12
Attending: PHYSICIAN ASSISTANT
Payer: COMMERCIAL

## 2025-05-12 VITALS — SYSTOLIC BLOOD PRESSURE: 130 MMHG | BODY MASS INDEX: 30.86 KG/M2 | WEIGHT: 174.2 LBS | DIASTOLIC BLOOD PRESSURE: 70 MMHG

## 2025-05-12 VITALS
DIASTOLIC BLOOD PRESSURE: 70 MMHG | HEIGHT: 63 IN | BODY MASS INDEX: 31.04 KG/M2 | HEART RATE: 77 BPM | OXYGEN SATURATION: 99 % | SYSTOLIC BLOOD PRESSURE: 128 MMHG | WEIGHT: 175.2 LBS

## 2025-05-12 DIAGNOSIS — Z34.01 NORMAL FIRST PREGNANCY CONFIRMED, CURRENTLY IN FIRST TRIMESTER: ICD-10-CM

## 2025-05-12 DIAGNOSIS — E66.811 OBESITY, CLASS I, BMI 30-34.9: ICD-10-CM

## 2025-05-12 DIAGNOSIS — Z34.01 ENCOUNTER FOR SUPERVISION OF NORMAL FIRST PREGNANCY, FIRST TRIMESTER: ICD-10-CM

## 2025-05-12 DIAGNOSIS — Z3A.12 12 WEEKS GESTATION OF PREGNANCY: ICD-10-CM

## 2025-05-12 DIAGNOSIS — Z36.82 NUCHAL TRANSLUCENCY OF FETUS ON PRENATAL ULTRASOUND: ICD-10-CM

## 2025-05-12 DIAGNOSIS — Z3A.12 12 WEEKS GESTATION OF PREGNANCY: Primary | ICD-10-CM

## 2025-05-12 DIAGNOSIS — Z36.0 ENCOUNTER FOR ANTENATAL SCREENING FOR CHROMOSOMAL ANOMALIES: Primary | ICD-10-CM

## 2025-05-12 PROBLEM — E66.812 CLASS 2 DRUG-INDUCED OBESITY WITHOUT SERIOUS COMORBIDITY WITH BODY MASS INDEX (BMI) OF 37.0 TO 37.9 IN ADULT: Status: RESOLVED | Noted: 2020-08-10 | Resolved: 2025-05-12

## 2025-05-12 PROBLEM — E66.1 CLASS 2 DRUG-INDUCED OBESITY WITHOUT SERIOUS COMORBIDITY WITH BODY MASS INDEX (BMI) OF 37.0 TO 37.9 IN ADULT: Status: RESOLVED | Noted: 2020-08-10 | Resolved: 2025-05-12

## 2025-05-12 PROCEDURE — 76801 OB US < 14 WKS SINGLE FETUS: CPT | Performed by: OBSTETRICS & GYNECOLOGY

## 2025-05-12 PROCEDURE — 76813 OB US NUCHAL MEAS 1 GEST: CPT | Performed by: OBSTETRICS & GYNECOLOGY

## 2025-05-12 PROCEDURE — G0145 SCR C/V CYTO,THINLAYER,RESCR: HCPCS | Performed by: PHYSICIAN ASSISTANT

## 2025-05-12 PROCEDURE — 36415 COLL VENOUS BLD VENIPUNCTURE: CPT | Performed by: OBSTETRICS & GYNECOLOGY

## 2025-05-12 PROCEDURE — PNV: Performed by: PHYSICIAN ASSISTANT

## 2025-05-12 PROCEDURE — 87591 N.GONORRHOEAE DNA AMP PROB: CPT | Performed by: PHYSICIAN ASSISTANT

## 2025-05-12 PROCEDURE — 87491 CHLMYD TRACH DNA AMP PROBE: CPT | Performed by: PHYSICIAN ASSISTANT

## 2025-05-12 PROCEDURE — 99203 OFFICE O/P NEW LOW 30 MIN: CPT | Performed by: OBSTETRICS & GYNECOLOGY

## 2025-05-12 NOTE — PROGRESS NOTES
Patient chose to have [facility] [id number] Non-Invasive Prenatal Screen [id number] PLUS w/ SCA, WITH fetal sex.  Patient choose to be billed through insurance.     Patient given brochure and is aware [facility] will contact patient's insurance and coordinate coverage.  Provided [facility] contact information. General inquiries [phone], Cost estimates [phone] and [facility] Billing [phone]. Website [email or web address].     Blood collection tubes labeled with patient identifiers (name, medical record number, and date of birth).     Filled out [facility] order form. Patient chose to have blood drawn in our office at time of visit. NIPS was drawn from left arm with a butterfly needle by Korina. .      If patient chose to have blood work drawn at a [facility] lab we requested patient notify MFM (via phone call or [employer] message) when blood collected so office can follow up on results.       Maternal Fetal Medicine will have results in approximately 5-7 business days and will call patient or notify via [employer].  Patient aware viewing lab result online will reveal fetal sex if ordered.    Patient verbalized understanding of all instructions and no questions at this time.

## 2025-05-12 NOTE — LETTER
May 12, 2025     Rebekah Hanks PA-C  [address]  [address]  [place] [address]    Patient: Merna Zarco   YOB: 1995   Date of Visit: 5/12/2025       Dear Ms. Rebekah Hanks PA-C:    Thank you for referring Merna Zarco to me for evaluation. Below are my notes for this consultation.    If you have questions, please do not hesitate to call me. I look forward to following your patient along with you.         Sincerely,        Trey Higgins MD        CC: No Recipients    Trey Higgins MD  5/12/2025  3:29 PM  Sign when Signing Visit  A fetal ultrasound was completed. See Ob procedures in Epic for an interpretation and recommendations. Do not hesitate to contact us in M if you have questions.    Trey Higgins MD, MSCE  Maternal Fetal Medicine      [facility]  5/12/2025 11:08 AM  Sign when Signing Visit  Patient chose to have [facility] [id number] Non-Invasive Prenatal Screen [id number] PLUS w/ SCA, WITH fetal sex.  Patient choose to be billed through insurance.     Patient given brochure and is aware [facility] will contact patient's insurance and coordinate coverage.  Provided [facility] contact information. General inquiries [phone], Cost estimates [phone] and [facility] Billing [phone]. Website [email or web address].     Blood collection tubes labeled with patient identifiers (name, medical record number, and date of birth).     Filled out [facility] order form. Patient chose to have blood drawn in our office at time of visit. NIPS was drawn from left arm with a butterfly needle by Korina. .      If patient chose to have blood work drawn at a [facility] lab we requested patient notify MFM (via phone call or [employer] message) when blood collected so office can follow up on results.       Maternal Fetal Medicine will have results in approximately 5-7 business days and will call patient or notify via [employer].  Patient aware viewing lab result online will reveal fetal sex if  ordered.    Patient verbalized understanding of all instructions and no questions at this time.

## 2025-05-12 NOTE — PROGRESS NOTES
A fetal ultrasound was completed. See Ob procedures in Epic for an interpretation and recommendations. Do not hesitate to contact us in [facility] if you have questions.    Trey Higgins MD, MSCE  Maternal Fetal Medicine

## 2025-05-12 NOTE — PROGRESS NOTES
Patient is a 30 YO  female presenting to the office at 12.4 weeks for routine OB care.   BP: 130/70  TWlb  Fetal Movement: none yet    29 y.o.  female at 12w4d (Estimated Date of Delivery: 25) for PNV.      TW.627 kg (10 lb 3.2 oz)    Cramping: no  Bleeding: no  LOF: no  NT/13 week scan scheduled: yes  Anatomy scan scheduled   AFP ordered if indicated: yes  Prenatal labs complete (including Heb B, HIV): yes; date completed   Pap collected: UTD  GC collected:yes  OK to transfuse and code  Oriented to practice/delivery location.   RTO 4 weeks

## 2025-05-14 LAB
C TRACH DNA SPEC QL NAA+PROBE: NEGATIVE
N GONORRHOEA DNA SPEC QL NAA+PROBE: NEGATIVE

## 2025-05-16 LAB
CFDNA.FET/CFDNA.TOTAL SFR FETUS: NORMAL %
CFDNA.FET/CFDNA.TOTAL SFR FETUS: NORMAL %
CITATION REF LAB TEST: NORMAL
FET 13+18+21+X+Y ANEUP PLAS.CFDNA: NEGATIVE
FET CHR 21 TS PLAS.CFDNA QL: NEGATIVE
FET CHR 21 TS PLAS.CFDNA QL: NEGATIVE
FET MS X RISK WBC.DNA+CFDNA QL: NOT DETECTED
FET SEX PLAS.CFDNA DOSAGE CFDNA: NORMAL
FET TS 13 RISK PLAS.CFDNA QL: NEGATIVE
FET X + Y ANEUP RISK PLAS.CFDNA SEQ-IMP: NOT DETECTED
GA EST FROM CONCEPTION DATE: NORMAL D
GESTATIONAL AGE > 9:: YES
LAB DIRECTOR NAME PROVIDER: NORMAL
LABORATORY COMMENT REPORT: NORMAL
LIMITATIONS OF THE TEST: NORMAL
NEGATIVE PREDICTIVE VALUE: NORMAL
PERFORMANCE CHARACTERISTICS: NORMAL
POSITIVE PREDICTIVE VALUE: NORMAL
REF LAB TEST METHOD: NORMAL
SL AMB NOTE:: NORMAL
TEST PERFORMANCE INFO SPEC: NORMAL

## 2025-05-19 ENCOUNTER — RESULTS FOLLOW-UP (OUTPATIENT)
Dept: OBGYN CLINIC | Facility: CLINIC | Age: 30
End: 2025-05-19

## 2025-05-19 ENCOUNTER — RESULTS FOLLOW-UP (OUTPATIENT)
Age: 30
End: 2025-05-19

## 2025-05-19 LAB
CFTR FULL MUT ANL BLD/T SEQ: NORMAL
CITATION REF LAB TEST: NORMAL
CLINICAL INFO: NORMAL
ETHNIC BACKGROUND STATED: NORMAL
GENE DIS ANL CARRIER INTERP-IMP: NORMAL
INDICATION: NORMAL
LAB AP GYN PRIMARY INTERPRETATION: NORMAL
LAB AP LMP: NORMAL
LAB DIRECTOR NAME PROVIDER: NORMAL
Lab: NORMAL
RECOMMENDATION PATIENT DOC-IMP: NORMAL
REF LAB TEST METHOD: NORMAL
SERVICE CMNT-IMP: NORMAL
SPECIMEN SOURCE: NORMAL

## 2025-06-02 ENCOUNTER — CLINICAL SUPPORT (OUTPATIENT)
Dept: POSTPARTUM | Facility: CLINIC | Age: 30
End: 2025-06-02

## 2025-06-02 DIAGNOSIS — Z32.2 ENCOUNTER FOR CHILDBIRTH INSTRUCTION: Primary | ICD-10-CM

## 2025-06-09 ENCOUNTER — CLINICAL SUPPORT (OUTPATIENT)
Dept: POSTPARTUM | Facility: CLINIC | Age: 30
End: 2025-06-09

## 2025-06-09 DIAGNOSIS — Z32.2 ENCOUNTER FOR CHILDBIRTH INSTRUCTION: Primary | ICD-10-CM

## 2025-06-11 ENCOUNTER — ROUTINE PRENATAL (OUTPATIENT)
Dept: OBGYN CLINIC | Facility: CLINIC | Age: 30
End: 2025-06-11

## 2025-06-11 DIAGNOSIS — Z34.02 PRIMIGRAVIDA IN SECOND TRIMESTER: Primary | ICD-10-CM

## 2025-06-11 DIAGNOSIS — Z3A.16 16 WEEKS GESTATION OF PREGNANCY: ICD-10-CM

## 2025-06-11 PROCEDURE — PNV: Performed by: OBSTETRICS & GYNECOLOGY

## 2025-06-11 RX ORDER — ASPIRIN 81 MG/1
81 TABLET, CHEWABLE ORAL 2 TIMES DAILY
COMMUNITY

## 2025-06-11 NOTE — PROGRESS NOTES
29 y.o.  female at 16w6d (Estimated Date of Delivery: 25) for PNV.    Pre- Vitals      Flowsheet Row Most Recent Value   Prenatal Assessment    Fetal Heart Rate 145   Prenatal Vitals    Urine Albumin/Glucose    Dilation/Effacement/Station    Vaginal Drainage    Edema           TW.173 kg (9 lb 3.2 oz)    Leakage of fluid: no  Vaginal bleeding: no  Contractions/Cramping: no  Fetal movement: yes    Has some headaches in the morning.   Thinks it may be contributed to allergies.   Discussed can take zyrtec in pregnancy.   If headaches are not improving, worsening or assoc with visual changes needs to call.     RTO in 4 weeks.

## 2025-06-16 ENCOUNTER — CLINICAL SUPPORT (OUTPATIENT)
Dept: POSTPARTUM | Facility: CLINIC | Age: 30
End: 2025-06-16

## 2025-06-16 DIAGNOSIS — Z32.2 ENCOUNTER FOR CHILDBIRTH INSTRUCTION: Primary | ICD-10-CM

## 2025-06-30 ENCOUNTER — CLINICAL SUPPORT (OUTPATIENT)
Dept: POSTPARTUM | Facility: CLINIC | Age: 30
End: 2025-06-30

## 2025-06-30 ENCOUNTER — APPOINTMENT (OUTPATIENT)
Dept: LAB | Facility: CLINIC | Age: 30
End: 2025-06-30
Attending: PHYSICIAN ASSISTANT
Payer: COMMERCIAL

## 2025-06-30 DIAGNOSIS — Z34.01 ENCOUNTER FOR SUPERVISION OF NORMAL FIRST PREGNANCY, FIRST TRIMESTER: ICD-10-CM

## 2025-06-30 DIAGNOSIS — Z3A.12 12 WEEKS GESTATION OF PREGNANCY: ICD-10-CM

## 2025-06-30 DIAGNOSIS — Z32.2 ENCOUNTER FOR CHILDBIRTH INSTRUCTION: Primary | ICD-10-CM

## 2025-06-30 PROCEDURE — 82105 ALPHA-FETOPROTEIN SERUM: CPT

## 2025-06-30 PROCEDURE — 36415 COLL VENOUS BLD VENIPUNCTURE: CPT

## 2025-07-02 LAB
2ND TRIMESTER 4 SCREEN SERPL-IMP: NORMAL
AFP ADJ MOM SERPL: 1.14
AFP INTERP AMN-IMP: NORMAL
AFP INTERP SERPL-IMP: NORMAL
AFP INTERP SERPL-IMP: NORMAL
AFP SERPL-MCNC: 55.1 NG/ML
AGE AT DELIVERY: 29.2 YR
GA METHOD: NORMAL
GA: 19.6 WEEKS
IDDM PATIENT QL: NO
MULTIPLE PREGNANCY: NO
NEURAL TUBE DEFECT RISK FETUS: 7850 %

## 2025-07-07 ENCOUNTER — HOSPITAL ENCOUNTER (OUTPATIENT)
Facility: HOSPITAL | Age: 30
Discharge: HOME | End: 2025-07-07
Attending: OBSTETRICS & GYNECOLOGY | Admitting: OBSTETRICS & GYNECOLOGY
Payer: COMMERCIAL

## 2025-07-07 ENCOUNTER — ROUTINE PRENATAL (OUTPATIENT)
Facility: HOSPITAL | Age: 30
End: 2025-07-07
Payer: COMMERCIAL

## 2025-07-07 ENCOUNTER — ANCILLARY PROCEDURE (OUTPATIENT)
Facility: HOSPITAL | Age: 30
End: 2025-07-07
Attending: OBSTETRICS & GYNECOLOGY
Payer: COMMERCIAL

## 2025-07-07 VITALS
BODY MASS INDEX: 33.91 KG/M2 | TEMPERATURE: 98.5 F | OXYGEN SATURATION: 99 % | HEART RATE: 85 BPM | SYSTOLIC BLOOD PRESSURE: 134 MMHG | HEIGHT: 63 IN | RESPIRATION RATE: 18 BRPM | DIASTOLIC BLOOD PRESSURE: 64 MMHG | WEIGHT: 191.38 LBS

## 2025-07-07 VITALS
SYSTOLIC BLOOD PRESSURE: 126 MMHG | DIASTOLIC BLOOD PRESSURE: 70 MMHG | HEIGHT: 63 IN | WEIGHT: 191.4 LBS | HEART RATE: 93 BPM | BODY MASS INDEX: 33.91 KG/M2

## 2025-07-07 DIAGNOSIS — Z3A.20 20 WEEKS GESTATION OF PREGNANCY: ICD-10-CM

## 2025-07-07 DIAGNOSIS — Z3A.20 20 WEEKS GESTATION OF PREGNANCY: Primary | ICD-10-CM

## 2025-07-07 DIAGNOSIS — N88.3 SHORT CERVIX: ICD-10-CM

## 2025-07-07 DIAGNOSIS — E66.811 OBESITY, CLASS I, BMI 30-34.9: ICD-10-CM

## 2025-07-07 DIAGNOSIS — N88.3 SHORT CERVIX: Primary | ICD-10-CM

## 2025-07-07 PROBLEM — O26.879 SHORT CERVIX AFFECTING PREGNANCY: Status: ACTIVE | Noted: 2025-07-07

## 2025-07-07 PROBLEM — O10.919 CHRONIC HYPERTENSION AFFECTING PREGNANCY: Status: ACTIVE | Noted: 2025-07-07

## 2025-07-07 LAB
BILIRUB UR QL STRIP: NEGATIVE
CLARITY UR: CLEAR
COLOR UR: YELLOW
GLUCOSE UR STRIP-MCNC: NEGATIVE MG/DL
HGB UR QL STRIP.AUTO: NEGATIVE
KETONES UR STRIP-MCNC: NEGATIVE MG/DL
LEUKOCYTE ESTERASE UR QL STRIP: NEGATIVE
NITRITE UR QL STRIP: NEGATIVE
PH UR STRIP.AUTO: 7 [PH] (ref 4.5–8)
PROT UR STRIP-MCNC: NEGATIVE MG/DL
SP GR UR STRIP.AUTO: 1.01 (ref 1–1.03)
UROBILINOGEN UR QL STRIP.AUTO: 0.2 E.U./DL

## 2025-07-07 PROCEDURE — 87591 N.GONORRHOEAE DNA AMP PROB: CPT

## 2025-07-07 PROCEDURE — G0463 HOSPITAL OUTPT CLINIC VISIT: HCPCS

## 2025-07-07 PROCEDURE — 99214 OFFICE O/P EST MOD 30 MIN: CPT | Performed by: OBSTETRICS & GYNECOLOGY

## 2025-07-07 PROCEDURE — 76811 OB US DETAILED SNGL FETUS: CPT | Performed by: OBSTETRICS & GYNECOLOGY

## 2025-07-07 PROCEDURE — 87491 CHLMYD TRACH DNA AMP PROBE: CPT

## 2025-07-07 PROCEDURE — 76817 TRANSVAGINAL US OBSTETRIC: CPT | Performed by: OBSTETRICS & GYNECOLOGY

## 2025-07-07 PROCEDURE — 99213 OFFICE O/P EST LOW 20 MIN: CPT

## 2025-07-07 PROCEDURE — NC001 PR NO CHARGE: Performed by: OBSTETRICS & GYNECOLOGY

## 2025-07-07 PROCEDURE — 81003 URINALYSIS AUTO W/O SCOPE: CPT

## 2025-07-07 RX ORDER — INDOMETHACIN 25 MG/1
25 CAPSULE ORAL EVERY 6 HOURS
Qty: 7 CAPSULE | Refills: 0 | Status: SHIPPED | OUTPATIENT
Start: 2025-07-07 | End: 2025-07-11

## 2025-07-07 NOTE — ASSESSMENT & PLAN NOTE
Incidentally found CL 0.7 cm at MFM today   No sign so PTL, infection, or abruption  UA wnl  GC pending   Discussed expectant management vs vaginal progesterone alone vs cerclage + vaginal progesterone  Patient has elected for cerclage. Scheduled for US indicated cerclage with Dr. Clinton 7/8 @ 1300. Discharge home and return tomorrow @1100.   Plan for 50 mg indocin 30 minutes prior to cerclage with 25 mg q6hrs x 48 hrs post procedure  Plan for Ancef 2 g 30 minutes prior to cerclage   Post op indocin and vaginal progesterone sent to pharmacy

## 2025-07-07 NOTE — LETTER
2025     Rebekah Hanks PA-C  [address]  [address]  [facility] [address]    Patient: Merna Zarco   YOB: 1995   Date of Visit: 2025       Dear GONZALEZ Reagan PA-C:    Thank you for referring Merna Zarco to me for evaluation. Below are my notes for this consultation.    If you have questions, please do not hesitate to call me. I look forward to following your patient along with you.         Sincerely,        Trey Higgins MD        CC: GONZALEZ Sy MD  2025  4:03 PM  Sign when Signing Visit  FOLLOW-UP: MATERNAL-FETAL MEDICINE  Name: Merna Zarco      : 1995      MRN: [id number]  Encounter Provider: Trey Higgins MD  Encounter Date: 2025   Encounter department: [facility]  :  Assessment & Plan  20 weeks gestation of pregnancy         Obesity, Class I, BMI 30-34.9         Short cervix  Cervical length below the 10th percentile (25 mm) in the second trimester is consistently associated with an increased risk of spontaneous  birth, with a particularly strong relationship when it occurs before 24 weeks of gestation or in women with a prior  birth, especially before 32 weeks. However, there is no threshold value below which the patient always delivers .     Several studies have found a benefit to vaginal progesterone in reducing the risk of  birth in women with a short cervix in the midtrimester. A 2012 meta-analysis of randomized controlled trials found that vaginal progesterone prolonged pregnancy in women with a short cervix and was associated with significant reductions in risk of  birth <28, <33, and <35 weeks and certain  adverse outcomes such as RDS, need for mechanical ventilation, NICU admission, and composite  morbidity and mortality.    In a meta-analysis of 5 RCTs including a total of 974 patients, daily  vaginal progesterone initiated between 18 to 25 weeks of gestation and continue to 34 to 36 weeks reduce the risk of  birth less than 37 weeks from 42% to 38%,  <32 weeks 19% to 12% and less than 28 weeks from 11% to 8%  (PMID [id number])     I recommended initiating vaginal progesterone 200mg nightly, to continue until 36 weeks unless a contraindication develops. Bedrest is not advised, but to avoid heavy lifting and strenuous activity. Pelvic rest is advised.     No measurable cervix:  In one study only 75% with no measurable cervical length at 14 -28 weeks of gestation delivered less than 32 weeks. In another study where providers were unaware of the patient's cervical ultrasound measurement at approximately 24 weeks and thus did not alter management based on the finding, delivery at less than 35 weeks occurred in 18% of women with cervical length below the 10th percentile (25 mm) and approximately 50% of those with a cervical length below the 1st percentile (13 mm).     Allison prime with cervical shortening <10mm as was identified today on Ms Eroh:    Very short cervix: Allison prime with cervical shortening <10mm:  In a allison pregnancy in a primiparous patient cervical cerclage for shortened cervix does not have data supporting it aside from the subset of women measuring <10 mm according to limited data from a planned subanalysis in a meta-analysis of 5 RCTs by Dr Ward et al., (Ultrasound Obstetrics and Gynecology 2017;50(5):569, PMID 61313765). This study showed in patients with cervical length <10 mm the risk of  birth <35 weeks was reduced to 39 versus 58%. Observational data of cerclage over vaginal progesterone alone in patients with very short <8-10 mm cervical lengths also support these data and this approach is supported by ISUOG 2022 guidelines (PMID 50867422)           History of Present Illness{?Quick Links Encounters * My Last Note * Last Note in Specialty * Snapshot *  "Since Last Visit * History :97182}    Merna Zarco is a 29 y.o. female  at 20w4d who presents for anatomic survey and cervical length screening ultrasound. She reports no obstetric complaints today.    Objective{?Quick Links Trend Vitals * Enter New Vitals * Results Review * Timeline (Adult) * Labs * Imaging * Cardiology * Procedures * Lung Cancer Screening * Surgical eConsent :45140}  /70 (BP Location: Right arm, Patient Position: Sitting, Cuff Size: Standard)   Pulse 93   Ht 5' 3\" (1.6 m)   Wt 86.8 kg (191 lb 6.4 oz)   LMP 2025 (Exact Date)   BMI 33.90 kg/m²      Patient's last menstrual period was 2025 (exact date).  Estimated Delivery Date: 2025, by Last Menstrual Period      Please refer to \"Imaging\" for ultrasound report from today's visit.     Plan and recommendations:    I referred Ms. Zarco and her partner Kali to labor and delivery for her to have a pelvic examination and for the results and receive further counseling depending on the findings    Administrative Statements{?Quick Links Full Problem List * Level of Service * PCMH/PCSP:39669}  I have spent a total time of *** minutes in caring for this patient on the day of the visit/encounter including {Counseling Topics:[id number]}.  "

## 2025-07-07 NOTE — PROGRESS NOTES
FOLLOW-UP: MATERNAL-FETAL MEDICINE  Name: Merna Zarco      : 1995      MRN: [id number]  Encounter Provider: Trey Higgins MD  Encounter Date: 2025   Encounter department: [facility]  :  Assessment & Plan  20 weeks gestation of pregnancy         Obesity, Class I, BMI 30-34.9         Short cervix  Cervical length below the 10th percentile (25 mm) in the second trimester is consistently associated with an increased risk of spontaneous  birth, with a particularly strong relationship when it occurs before 24 weeks of gestation or in women with a prior  birth, especially before 32 weeks. However, there is no threshold value below which the patient always delivers .     Several studies have found a benefit to vaginal progesterone in reducing the risk of  birth in women with a short cervix in the midtrimester. A 2012 meta-analysis of randomized controlled trials found that vaginal progesterone prolonged pregnancy in women with a short cervix and was associated with significant reductions in risk of  birth <28, <33, and <35 weeks and certain  adverse outcomes such as RDS, need for mechanical ventilation, NICU admission, and composite  morbidity and mortality.    In a meta-analysis of 5 RCTs including a total of 974 patients, daily vaginal progesterone initiated between 18 to 25 weeks of gestation and continue to 34 to 36 weeks reduce the risk of  birth less than 37 weeks from 42% to 38%,  <32 weeks 19% to 12% and less than 28 weeks from 11% to 8%  (PMID [id number])     I recommended initiating vaginal progesterone 200mg nightly, to continue until 36 weeks unless a contraindication develops. Bedrest is not advised, but to avoid heavy lifting and strenuous activity. Pelvic rest is advised.     No measurable cervix:  In one study only 75% with no measurable cervical length at 14 -28 weeks of gestation delivered less than 32 weeks.  "In another study where providers were unaware of the patient's cervical ultrasound measurement at approximately 24 weeks and thus did not alter management based on the finding, delivery at less than 35 weeks occurred in 18% of women with cervical length below the 10th percentile (25 mm) and approximately 50% of those with a cervical length below the 1st percentile (13 mm).     Allison prime with cervical shortening <10mm as was identified today on Ms Zarco:    Very short cervix: Allison prime with cervical shortening <10mm:  In a allison pregnancy in a primiparous patient cervical cerclage for shortened cervix does not have data supporting it aside from the subset of women measuring <10 mm according to limited data from a planned subanalysis in a meta-analysis of 5 RCTs by Dr Ward et al., (Ultrasound Obstetrics and Gynecology 2017;50(5):569, PMID 98546405). This study showed in patients with cervical length <10 mm the risk of  birth <35 weeks was reduced to 39 versus 58%. Observational data of cerclage over vaginal progesterone alone in patients with very short <8-10 mm cervical lengths also support these data and this approach is supported by ISUOG 2022 guidelines (PMID 02878399)           History of Present Illness     Merna Zarco is a 29 y.o. female  at 20w4d who presents for anatomic survey and cervical length screening ultrasound. She reports no obstetric complaints today.    Objective   /70 (BP Location: Right arm, Patient Position: Sitting, Cuff Size: Standard)   Pulse 93   Ht 5' 3\" (1.6 m)   Wt 86.8 kg (191 lb 6.4 oz)   LMP 2025 (Exact Date)   BMI 33.90 kg/m²      Patient's last menstrual period was 2025 (exact date).  Estimated Delivery Date: 2025, by Last Menstrual Period      Please refer to \"Imaging\" for ultrasound report from today's visit.     Plan and recommendations:    I referred Ms. Zarco and her partner Kali to labor and delivery for her to " have a pelvic examination and for the results and receive further counseling depending on the findings    Administrative Statements   I have spent a total time of 30 minutes in caring for this patient on the day of the visit/encounter including Diagnostic results, Prognosis, Risks and benefits of tx options, Instructions for management, Patient and family education, Importance of tx compliance, Risk factor reductions, Impressions, Counseling / Coordination of care, Documenting in the medical record, Reviewing/placing orders in the medical record (including tests, medications, and/or procedures), Obtaining or reviewing history  , and Communicating with other healthcare professionals .

## 2025-07-07 NOTE — CONSULTS
Consultation - OB/GYN   Name: Merna Zarco 29 y.o. female I MRN: [id number]  Unit/Bed#: LD TRIAGE  I Date of Admission: 2025   Date of Service: 2025 I Hospital Day: 0   Inpatient consult to Perinatology  Consult performed by: Mya Keenan MD  Consult ordered by: Jasmyne Hernandez MD        Physician Requesting Evaluation: No att. providers found   Reason for Evaluation / Principal Problem: incidentally short cervix     Assessment & Plan  Short cervix affecting pregnancy  Incidentally found CL 0.7 cm at MFM today   No sign so PTL, infection, or abruption  UA wnl  GC pending   Discussed expectant management vs vaginal progesterone alone vs cerclage + vaginal progesterone  Patient has elected for cerclage. Scheduled for US indicated cerclage with Dr. Clinton  @ 1300. Discharge home and return tomorrow @1100.   Plan for 50 mg indocin 30 minutes prior to cerclage with 25 mg q6hrs x 48 hrs post procedure  Plan for Ancef 2 g 30 minutes prior to cerclage   Post op indocin and vaginal progesterone sent to pharmacy   Family history of diabetes mellitus  Early 1 hr glucola wnl   Anxiety  Not currently on medication  Iron deficiency anemia secondary to inadequate dietary iron intake  Continue iron supplementation in prenatal vitamin  Obesity, Class I, BMI 30-34.9    20 weeks gestation of pregnancy  Up to date on prenatal care  Chronic hypertension affecting pregnancy  140/82 at 8w  144/68 at 12w  1 elevated in triage in the setting of anxiety         History of Present Illness   Patient of: [facility]  Brief Summary: Merna Zarco  with an MINDA of 2025, by Last Menstrual Period at 20w4d presenting for incidentally found shortened cervix.    Chief Complaint: none    HPI:  Merna is a 28 yo  who presented to perinatology today for her scheduled early anatomy scan.  She was found to have an shortened cervix of 7 mm.  She presented to triage for pelvic examination and discussion of  management of shortness cervix.  She is nervous but otherwise feels well.  She had some cramping in early pregnancy but otherwise has had no abdominal pain or cramping.    Contractions: None.   Leakage of fluid: None  Bleeding: None.   Fetal movement: at times    Review of Systems   Constitutional:  Negative for chills and fever.   HENT:  Negative for congestion, rhinorrhea, sneezing and sore throat.    Eyes:  Negative for photophobia and visual disturbance.   Respiratory:  Negative for cough and shortness of breath.    Cardiovascular:  Negative for chest pain.   Gastrointestinal:  Negative for diarrhea, nausea and vomiting.   Genitourinary:  Negative for dysuria and frequency.   Neurological:  Negative for dizziness, numbness and headaches.   Psychiatric/Behavioral: Negative.         Historical Information   Medical History Review: I have reviewed the patient's PMH, PSH, Social History, Family History, Meds, and Allergies   Historical Information   Past Medical History[1]  Past Surgical History[2]  Social History[3]  E-Cigarette/Vaping    E-Cigarette Use Former User     Start Date 4/5/16     Quit Date 7/2/16     Comments Quit  socially  4 years ago      E-Cigarette/Vaping Substances    Nicotine Yes     THC Yes medical marijunia  card    CBD No     Flavoring Yes     Other No     Unknown No        No current facility-administered medications for this encounter.  Prior to Admission Medications   Prescriptions Last Dose Informant Patient Reported? Taking?   Prenatal MV-Min-Fe Fum-FA-DHA (PRENATAL 1 PO) 7/6/2025 Bedtime Self Yes Yes   Sig: Take 1 tablet by mouth in the morning   aspirin 81 mg chewable tablet 7/7/2025 Morning Self Yes Yes   Sig: Chew 81 mg 2 (two) times a day   calcium carbonate (TUMS) 500 mg chewable tablet  Self Yes No   Sig: Chew 1 tablet daily   famotidine (PEPCID) 20 mg tablet 7/6/2025 Bedtime Self Yes Yes   Sig: Take 20 mg by mouth in the morning and 20 mg in the evening.      Facility-Administered  Medications: None     Amoxicillin, Cefaclor, and Penicillin v  OB History    Para Term  AB Living   1 0 0 0 0 0   SAB IAB Ectopic Multiple Live Births   0 0 0 0 0      # Outcome Date GA Lbr David/2nd Weight Sex Type Anes PTL Lv   1 Current                Objective :  Temp:  [98.5 °F (36.9 °C)] 98.5 °F (36.9 °C)  HR:  [76-93] 85  BP: (121-140)/(58-70) 134/64  Resp:  [18] 18  SpO2:  [99 %] 99 %    Physical Exam  Vitals reviewed. Exam conducted with a chaperone present.   Constitutional:       Appearance: Normal appearance.   HENT:      Head: Normocephalic and atraumatic.      Mouth/Throat:      Pharynx: Oropharynx is clear.     Eyes:      General: No scleral icterus.     Extraocular Movements: Extraocular movements intact.       Cardiovascular:      Rate and Rhythm: Normal rate and regular rhythm.      Pulses: Normal pulses.   Pulmonary:      Effort: Pulmonary effort is normal. No respiratory distress.   Abdominal:      Palpations: Abdomen is soft.      Tenderness: There is no abdominal tenderness. There is no guarding or rebound.   Genitourinary:     Comments: Speculum: Normal-appearing vulva, vagina, cervix without lesion or laceration.  No abnormal discharge.  No bleeding.  Cervix visibly closed.    Musculoskeletal:      Right lower leg: No edema.      Left lower leg: No edema.     Skin:     General: Skin is dry.     Neurological:      General: No focal deficit present.      Mental Status: She is alert and oriented to person, place, and time.     Psychiatric:         Mood and Affect: Mood normal.         Behavior: Behavior normal.      Comments: Patient appropriately nervous           Cervical Exam  C/T/H   Contractions:  None palpable   Fetal heart rate  160 bpm    Lab Results: I have reviewed the following results:    Blood type: B pos  Antibody: neg  GBS: neg  HIV: NR  Rubella: immune  Syphilis IgM/IgG: NR  HBsAg: NR  HCAb: NR  Chlamydia: neg  Gonorrhea: neg  Early 1 hr: 116  Diabetes 1 hour screen:  not yet completed   3 hour glucose: not yet completed  Platelets: 272  Hgb: 12.4    Mya Keenan MD  PGY-III MFM          [1]   Past Medical History:  Diagnosis Date    Anxiety     Contraception     Diarrhea     Ear piercing     GERD (gastroesophageal reflux disease)     IUD contraception 01/30/2020    Kyleena    Sleep related teeth grinding     Varicella     Had pox , and  shingles 18    Vitamin D deficiency    [2]   Past Surgical History:  Procedure Laterality Date    APPENDECTOMY  5/27/22    APPENDECTOMY LAPAROSCOPIC N/A 05/27/2022    Procedure: APPENDECTOMY LAPAROSCOPIC;  Surgeon: Chely Tapia MD;  Location: [facility];  Service: General    COLONOSCOPY N/A 03/30/2018    Procedure: COLONOSCOPY;  Surgeon: Ralf Purdy MD;  Location: [facility] GI LAB;  Service: Gastroenterology    ESOPHAGOGASTRODUODENOSCOPY      VT HYSTEROSCOPY DIAGNOSTIC SEPARATE PROCEDURE N/A 7/9/2024    Procedure: HYSTEROSCOPY FOR IUD REMOVAL, EXAM UNDER ANESTHESIA;  Surgeon: Tika Montero MD;  Location: [facility];  Service: Gynecology    VT REMOVAL INTRAUTERINE DEVICE IUD N/A 7/9/2024    Procedure: REMOVAL OF INTRAUTERINE DEVICE (IUD);  Surgeon: Tika Montero MD;  Location: [facility];  Service: Gynecology    WISDOM TOOTH EXTRACTION     [3]   Social History  Tobacco Use    Smoking status: Former     Current packs/day: 0.00     Average packs/day: 0.1 packs/day for 2.0 years (0.2 ttl pk-yrs)     Types: Cigarettes     Start date: 2020     Quit date: 2022     Years since quitting: 3.5    Smokeless tobacco: Never    Tobacco comments:     Socially    Vaping Use    Vaping status: Former    Start date: 4/5/2016    Quit date: 7/2/2016    Substances: Nicotine, THC (medical marijunia  card), Flavoring   Substance and Sexual Activity    Alcohol use: Not Currently     Comment: Socially    Drug use: Yes     Types: Marijuana     Comment: medially    Sexual activity: Yes     Partners: Male     Birth control/protection: None

## 2025-07-07 NOTE — LETTER
2025     Rebekah Hanks PA-C  [address]  [address]  [facility] [address]    Patient: Merna Zarco   YOB: 1995   Date of Visit: 2025       Dear ms  GONZALEZ Pickens PA-C:    Thank you for referring Merna Zarco to me for evaluation. Below are my notes for this consultation.    If you have questions, please do not hesitate to call me. I look forward to following your patient along with you.         Sincerely,        Trey Higgins MD        CC: GONZALEZ Sy MD  2025  4:04 PM  Sign when Signing Visit  FOLLOW-UP: MATERNAL-FETAL MEDICINE  Name: Merna Zarco      : 1995      MRN: [id number]  Encounter Provider: Trey Higgins MD  Encounter Date: 2025   Encounter department: [facility]  :  Assessment & Plan  20 weeks gestation of pregnancy         Obesity, Class I, BMI 30-34.9         Short cervix  Cervical length below the 10th percentile (25 mm) in the second trimester is consistently associated with an increased risk of spontaneous  birth, with a particularly strong relationship when it occurs before 24 weeks of gestation or in women with a prior  birth, especially before 32 weeks. However, there is no threshold value below which the patient always delivers .     Several studies have found a benefit to vaginal progesterone in reducing the risk of  birth in women with a short cervix in the midtrimester. A 2012 meta-analysis of randomized controlled trials found that vaginal progesterone prolonged pregnancy in women with a short cervix and was associated with significant reductions in risk of  birth <28, <33, and <35 weeks and certain  adverse outcomes such as RDS, need for mechanical ventilation, NICU admission, and composite  morbidity and mortality.    In a meta-analysis of 5 RCTs including a total of 974 patients, daily  vaginal progesterone initiated between 18 to 25 weeks of gestation and continue to 34 to 36 weeks reduce the risk of  birth less than 37 weeks from 42% to 38%,  <32 weeks 19% to 12% and less than 28 weeks from 11% to 8%  (PMID [id number])     I recommended initiating vaginal progesterone 200mg nightly, to continue until 36 weeks unless a contraindication develops. Bedrest is not advised, but to avoid heavy lifting and strenuous activity. Pelvic rest is advised.     No measurable cervix:  In one study only 75% with no measurable cervical length at 14 -28 weeks of gestation delivered less than 32 weeks. In another study where providers were unaware of the patient's cervical ultrasound measurement at approximately 24 weeks and thus did not alter management based on the finding, delivery at less than 35 weeks occurred in 18% of women with cervical length below the 10th percentile (25 mm) and approximately 50% of those with a cervical length below the 1st percentile (13 mm).     Allison prime with cervical shortening <10mm as was identified today on Ms Zarco:    Very short cervix: Allison prime with cervical shortening <10mm:  In a allison pregnancy in a primiparous patient cervical cerclage for shortened cervix does not have data supporting it aside from the subset of women measuring <10 mm according to limited data from a planned subanalysis in a meta-analysis of 5 RCTs by Dr Ward et al., (Ultrasound Obstetrics and Gynecology 2017;50(5):569, PMID [id number]). This study showed in patients with cervical length <10 mm the risk of  birth <35 weeks was reduced to 39 versus 58%. Observational data of cerclage over vaginal progesterone alone in patients with very short <8-10 mm cervical lengths also support these data and this approach is supported by ISUOG 2022 guidelines (PMID [id number])           History of Present Illness    Merna Zarco is a 29 y.o. female  at 20w4d who presents for anatomic  "survey and cervical length screening ultrasound. She reports no obstetric complaints today.    Objective  /70 (BP Location: Right arm, Patient Position: Sitting, Cuff Size: Standard)   Pulse 93   Ht 5' 3\" (1.6 m)   Wt 86.8 kg (191 lb 6.4 oz)   LMP 02/13/2025 (Exact Date)   BMI 33.90 kg/m²      Patient's last menstrual period was 02/13/2025 (exact date).  Estimated Delivery Date: 11/20/2025, by Last Menstrual Period      Please refer to \"Imaging\" for ultrasound report from today's visit.     Plan and recommendations:    I referred Ms. Zarco and her partner Kali to labor and delivery for her to have a pelvic examination and for the results and receive further counseling depending on the findings    Administrative Statements  I have spent a total time of 30 minutes in caring for this patient on the day of the visit/encounter including Diagnostic results, Prognosis, Risks and benefits of tx options, Instructions for management, Patient and family education, Importance of tx compliance, Risk factor reductions, Impressions, Counseling / Coordination of care, Documenting in the medical record, Reviewing/placing orders in the medical record (including tests, medications, and/or procedures), Obtaining or reviewing history  , and Communicating with other healthcare professionals .  "

## 2025-07-08 ENCOUNTER — APPOINTMENT (OUTPATIENT)
Facility: HOSPITAL | Age: 30
End: 2025-07-08
Attending: OBSTETRICS & GYNECOLOGY
Payer: COMMERCIAL

## 2025-07-08 ENCOUNTER — HOSPITAL ENCOUNTER (OUTPATIENT)
Facility: HOSPITAL | Age: 30
Setting detail: OUTPATIENT SURGERY
Discharge: HOME | End: 2025-07-08
Attending: OBSTETRICS & GYNECOLOGY | Admitting: OBSTETRICS & GYNECOLOGY
Payer: COMMERCIAL

## 2025-07-08 ENCOUNTER — ANESTHESIA (OUTPATIENT)
Dept: LABOR AND DELIVERY | Facility: HOSPITAL | Age: 30
End: 2025-07-08
Payer: COMMERCIAL

## 2025-07-08 ENCOUNTER — ANESTHESIA EVENT (OUTPATIENT)
Dept: LABOR AND DELIVERY | Facility: HOSPITAL | Age: 30
End: 2025-07-08
Payer: COMMERCIAL

## 2025-07-08 VITALS
WEIGHT: 191 LBS | DIASTOLIC BLOOD PRESSURE: 61 MMHG | BODY MASS INDEX: 33.84 KG/M2 | HEART RATE: 66 BPM | OXYGEN SATURATION: 98 % | SYSTOLIC BLOOD PRESSURE: 116 MMHG | RESPIRATION RATE: 16 BRPM | HEIGHT: 63 IN | TEMPERATURE: 97.7 F

## 2025-07-08 DIAGNOSIS — O26.879 CERVICAL SHORTENING AFFECTING PREGNANCY: Primary | ICD-10-CM

## 2025-07-08 PROBLEM — N88.3 SHORT CERVIX: Status: ACTIVE | Noted: 2025-07-08

## 2025-07-08 LAB
ABO GROUP BLD: NORMAL
ALBUMIN SERPL BCG-MCNC: 3.8 G/DL (ref 3.5–5)
ALP SERPL-CCNC: 54 U/L (ref 34–104)
ALT SERPL W P-5'-P-CCNC: 18 U/L (ref 7–52)
ANION GAP SERPL CALCULATED.3IONS-SCNC: 8 MMOL/L (ref 4–13)
AST SERPL W P-5'-P-CCNC: 19 U/L (ref 13–39)
BASOPHILS # BLD AUTO: 0.04 THOUSANDS/ÂΜL (ref 0–0.1)
BASOPHILS NFR BLD AUTO: 0 % (ref 0–1)
BILIRUB SERPL-MCNC: 0.37 MG/DL (ref 0.2–1)
BLD GP AB SCN SERPL QL: NEGATIVE
BUN SERPL-MCNC: 9 MG/DL (ref 5–25)
C TRACH DNA SPEC QL NAA+PROBE: NEGATIVE
CALCIUM SERPL-MCNC: 9.2 MG/DL (ref 8.4–10.2)
CHLORIDE SERPL-SCNC: 107 MMOL/L (ref 96–108)
CO2 SERPL-SCNC: 22 MMOL/L (ref 21–32)
CREAT SERPL-MCNC: 0.5 MG/DL (ref 0.6–1.3)
EOSINOPHIL # BLD AUTO: 0.03 THOUSAND/ÂΜL (ref 0–0.61)
EOSINOPHIL NFR BLD AUTO: 0 % (ref 0–6)
ERYTHROCYTE [DISTWIDTH] IN BLOOD BY AUTOMATED COUNT: 12.3 % (ref 11.6–15.1)
GFR SERPL CREATININE-BSD FRML MDRD: 132 ML/MIN/1.73SQ M
GLUCOSE SERPL-MCNC: 75 MG/DL (ref 65–140)
HCT VFR BLD AUTO: 36.2 % (ref 34.8–46.1)
HGB BLD-MCNC: 12.5 G/DL (ref 11.5–15.4)
IMM GRANULOCYTES # BLD AUTO: 0.26 THOUSAND/UL (ref 0–0.2)
IMM GRANULOCYTES NFR BLD AUTO: 2 % (ref 0–2)
LYMPHOCYTES # BLD AUTO: 1.81 THOUSANDS/ÂΜL (ref 0.6–4.47)
LYMPHOCYTES NFR BLD AUTO: 16 % (ref 14–44)
MCH RBC QN AUTO: 30.5 PG (ref 26.8–34.3)
MCHC RBC AUTO-ENTMCNC: 34.5 G/DL (ref 31.4–37.4)
MCV RBC AUTO: 88 FL (ref 82–98)
MONOCYTES # BLD AUTO: 0.7 THOUSAND/ÂΜL (ref 0.17–1.22)
MONOCYTES NFR BLD AUTO: 6 % (ref 4–12)
N GONORRHOEA DNA SPEC QL NAA+PROBE: NEGATIVE
NEUTROPHILS # BLD AUTO: 8.33 THOUSANDS/ÂΜL (ref 1.85–7.62)
NEUTS SEG NFR BLD AUTO: 76 % (ref 43–75)
NRBC BLD AUTO-RTO: 0 /100 WBCS
PLATELET # BLD AUTO: 253 THOUSANDS/UL (ref 149–390)
PMV BLD AUTO: 9.4 FL (ref 8.9–12.7)
POTASSIUM SERPL-SCNC: 3.8 MMOL/L (ref 3.5–5.3)
PROT SERPL-MCNC: 7 G/DL (ref 6.4–8.4)
RBC # BLD AUTO: 4.1 MILLION/UL (ref 3.81–5.12)
RH BLD: NEGATIVE
SODIUM SERPL-SCNC: 137 MMOL/L (ref 135–147)
SPECIMEN EXPIRATION DATE: NORMAL
WBC # BLD AUTO: 11.17 THOUSAND/UL (ref 4.31–10.16)

## 2025-07-08 PROCEDURE — 86901 BLOOD TYPING SEROLOGIC RH(D): CPT

## 2025-07-08 PROCEDURE — 85025 COMPLETE CBC W/AUTO DIFF WBC: CPT

## 2025-07-08 PROCEDURE — 76815 OB US LIMITED FETUS(S): CPT | Performed by: OBSTETRICS & GYNECOLOGY

## 2025-07-08 PROCEDURE — 81403 MOPATH PROCEDURE LEVEL 4: CPT

## 2025-07-08 PROCEDURE — 86900 BLOOD TYPING SEROLOGIC ABO: CPT

## 2025-07-08 PROCEDURE — 80053 COMPREHEN METABOLIC PANEL: CPT

## 2025-07-08 PROCEDURE — 76817 TRANSVAGINAL US OBSTETRIC: CPT | Performed by: OBSTETRICS & GYNECOLOGY

## 2025-07-08 PROCEDURE — 59320 REVISION OF CERVIX: CPT | Performed by: OBSTETRICS & GYNECOLOGY

## 2025-07-08 PROCEDURE — NC001 PR NO CHARGE: Performed by: OBSTETRICS & GYNECOLOGY

## 2025-07-08 PROCEDURE — 86850 RBC ANTIBODY SCREEN: CPT

## 2025-07-08 PROCEDURE — 86780 TREPONEMA PALLIDUM: CPT

## 2025-07-08 RX ORDER — ACETAMINOPHEN 325 MG/1
975 TABLET ORAL EVERY 8 HOURS PRN
Status: DISCONTINUED | OUTPATIENT
Start: 2025-07-08 | End: 2025-07-08 | Stop reason: HOSPADM

## 2025-07-08 RX ORDER — MIDAZOLAM HYDROCHLORIDE 2 MG/2ML
INJECTION, SOLUTION INTRAMUSCULAR; INTRAVENOUS AS NEEDED
Status: DISCONTINUED | OUTPATIENT
Start: 2025-07-08 | End: 2025-07-08

## 2025-07-08 RX ORDER — SODIUM CHLORIDE, SODIUM LACTATE, POTASSIUM CHLORIDE, CALCIUM CHLORIDE 600; 310; 30; 20 MG/100ML; MG/100ML; MG/100ML; MG/100ML
INJECTION, SOLUTION INTRAVENOUS CONTINUOUS PRN
Status: DISCONTINUED | OUTPATIENT
Start: 2025-07-08 | End: 2025-07-08

## 2025-07-08 RX ORDER — INDOMETHACIN 25 MG/1
50 CAPSULE ORAL ONCE
Status: COMPLETED | OUTPATIENT
Start: 2025-07-08 | End: 2025-07-08

## 2025-07-08 RX ORDER — PROPOFOL 10 MG/ML
INJECTION, EMULSION INTRAVENOUS CONTINUOUS PRN
Status: DISCONTINUED | OUTPATIENT
Start: 2025-07-08 | End: 2025-07-08

## 2025-07-08 RX ORDER — CHLOROPROCAINE HYDROCHLORIDE 30 MG/ML
INJECTION, SOLUTION EPIDURAL; INFILTRATION; INTRACAUDAL; PERINEURAL AS NEEDED
Status: DISCONTINUED | OUTPATIENT
Start: 2025-07-08 | End: 2025-07-08

## 2025-07-08 RX ORDER — CEFAZOLIN SODIUM 2 G/50ML
2000 SOLUTION INTRAVENOUS ONCE
Status: COMPLETED | OUTPATIENT
Start: 2025-07-08 | End: 2025-07-08

## 2025-07-08 RX ADMIN — ACETAMINOPHEN 975 MG: 325 TABLET, FILM COATED ORAL at 17:06

## 2025-07-08 RX ADMIN — INDOMETHACIN 50 MG: 25 CAPSULE ORAL at 14:15

## 2025-07-08 RX ADMIN — CHLOROPROCAINE HYDROCHLORIDE 50 MG: 30 INJECTION, SOLUTION EPIDURAL; INFILTRATION; INTRACAUDAL; PERINEURAL at 14:33

## 2025-07-08 RX ADMIN — PROPOFOL 30 MCG/KG/MIN: 10 INJECTION, EMULSION INTRAVENOUS at 14:42

## 2025-07-08 RX ADMIN — CEFAZOLIN SODIUM 2000 MG: 2 SOLUTION INTRAVENOUS at 14:33

## 2025-07-08 RX ADMIN — SODIUM CHLORIDE, SODIUM LACTATE, POTASSIUM CHLORIDE, AND CALCIUM CHLORIDE: .6; .31; .03; .02 INJECTION, SOLUTION INTRAVENOUS at 14:30

## 2025-07-08 RX ADMIN — HUMAN RHO(D) IMMUNE GLOBULIN 300 MCG: 300 INJECTION, SOLUTION INTRAMUSCULAR at 18:44

## 2025-07-08 RX ADMIN — MIDAZOLAM HYDROCHLORIDE 2 MG: 1 INJECTION, SOLUTION INTRAMUSCULAR; INTRAVENOUS at 14:31

## 2025-07-08 NOTE — ANESTHESIA PREPROCEDURE EVALUATION
Procedure:  CERCLAGE CERVICAL (Cervix)    Relevant Problems   CARDIO   (+) Chronic hypertension affecting pregnancy      GYN   (+) 20 weeks gestation of pregnancy      HEMATOLOGY   (+) Iron deficiency anemia secondary to inadequate dietary iron intake      NEURO/PSYCH   (+) Anxiety   (+) Mild episode of recurrent major depressive disorder (HCC)        Physical Exam    Airway     Mallampati score: II    Neck ROM: full      Cardiovascular      Dental       Pulmonary      Neurological      Other Findings  post-pubertal.      Anesthesia Plan  ASA Score- 2     Anesthesia Type- spinal with ASA Monitors.         Additional Monitors:     Airway Plan:     Comment: I, Dr. Benton, the attending physician, have personally seen and evaluated the patient prior to anesthetic care.  I have reviewed the pre-anesthetic record, and other medical records if appropriate to the anesthetic care.  If a CRNA is involved in the case, I have reviewed the CRNA assessment, if present, and agree.  The patient is in a suitable condition to proceed with my formulated anesthetic plan.  .       Plan Factors-    Chart reviewed.                      Induction-     Postoperative Plan-         Informed Consent- Anesthetic plan and risks discussed with patient.  I personally reviewed this patient with the CRNA. Discussed and agreed on the Anesthesia Plan with the CRNA..      NPO Status:  No vitals data found for the desired time range.

## 2025-07-08 NOTE — H&P
H&P - OB/GYN   Name: Merna Zarco 29 y.o. female I MRN: [id number]  Unit/Bed#:  TRIAGE 3- I Date of Admission: 2025   Date of Service: 2025 I Hospital Day: 0     Assessment & Plan  20 weeks gestation of pregnancy  Admit for planned cerclage   Indocin 50 mg prior to procedure and 25 mg q6 x 48 hrs after procedure   Vaginal progesterone 200 mg qhs   FU outpatient for repeat TVUS  Chronic hypertension affecting pregnancy  Elevated blood pressures at 8 and 15 weeks   Asymptomatic   Baseline labs ordered   Short cervix affecting pregnancy  7 mm incidentally at early anatomy scan on   SVE C/T/H on   Plan to proceed with US indicated cerclage     History of Present Illness   Patient of: [facility]  Brief Summary: Merna Zarco  with an MINDA of 2025, by Last Menstrual Period at 20w5d presenting for scheduled cerclage placement for US indication with CL of 7 mm. .    Chief Complaint: none    HPI:  Contractions: None.   Leakage of fluid: None.   Bleeding: None.   Fetal movement: at times.    Review of Systems   Constitutional:  Negative for chills and fever.   HENT:  Negative for congestion, rhinorrhea, sneezing and sore throat.    Eyes:  Negative for photophobia and visual disturbance.   Respiratory:  Negative for cough and shortness of breath.    Cardiovascular:  Negative for chest pain.   Gastrointestinal:  Negative for diarrhea, nausea and vomiting.   Genitourinary:  Negative for dysuria and frequency.   Neurological:  Negative for dizziness, numbness and headaches.   Psychiatric/Behavioral: Negative.         Historical Information   Medical History Review: I have reviewed the patient's PMH, PSH, Social History, Family History, Meds, and Allergies   Historical Information   Past Medical History[1]  Past Surgical History[2]  Social History[3]  E-Cigarette/Vaping    E-Cigarette Use Former User     Start Date 16     Quit Date 16     Comments Quit  socially  4 years ago       E-Cigarette/Vaping Substances    Nicotine Yes     THC Yes medical marijunia  card    CBD No     Flavoring Yes     Other No     Unknown No        Current Facility-Administered Medications:     ceFAZolin (ANCEF) IVPB (premix in dextrose) 2,000 mg 50 mL, Once    indomethacin (INDOCIN) capsule 50 mg, Once  Prior to Admission Medications   Prescriptions Last Dose Informant Patient Reported? Taking?   Prenatal MV-Min-Fe Fum-FA-DHA (PRENATAL 1 PO) 2025 Bedtime Self Yes Yes   Sig: Take 1 tablet by mouth in the morning   aspirin 81 mg chewable tablet 2025 Morning Self Yes Yes   Sig: Chew 81 mg 2 (two) times a day   calcium carbonate (TUMS) 500 mg chewable tablet  Self Yes No   Sig: Chew 1 tablet daily   famotidine (PEPCID) 20 mg tablet Past Week Self Yes Yes   Sig: Take 20 mg by mouth in the morning and 20 mg in the evening.   indomethacin (INDOCIN) 25 mg capsule   No No   Sig: Take 1 capsule (25 mg total) by mouth every 6 (six) hours for 7 doses   progesterone (ENDOMETRIN) 100 MG vaginal insert   No No   Sig: Insert 2 tablets (200 mg total) into the vagina daily at bedtime      Facility-Administered Medications: None     Amoxicillin, Cefaclor, and Penicillin v  OB History    Para Term  AB Living   1 0 0 0 0 0   SAB IAB Ectopic Multiple Live Births   0 0 0 0 0      # Outcome Date GA Lbr David/2nd Weight Sex Type Anes PTL Lv   1 Current                Objective :  Temp:  [97.9 °F (36.6 °C)-98.5 °F (36.9 °C)] 97.9 °F (36.6 °C)  HR:  [68-93] 68  BP: (121-140)/(58-70) 121/59  Resp:  [18] 18  SpO2:  [98 %-99 %] 98 %    Physical Exam  Vitals and nursing note reviewed. Exam conducted with a chaperone present.   Constitutional:       General: She is not in acute distress.     Appearance: She is well-developed.   HENT:      Head: Normocephalic and atraumatic.      Mouth/Throat:      Pharynx: Oropharynx is clear.     Eyes:      Extraocular Movements: Extraocular movements intact.      Conjunctiva/sclera:  Conjunctivae normal.       Cardiovascular:      Rate and Rhythm: Normal rate and regular rhythm.   Pulmonary:      Effort: Pulmonary effort is normal. No respiratory distress.   Abdominal:      Palpations: Abdomen is soft.      Tenderness: There is no abdominal tenderness.   Genitourinary:     Comments: SVE deferred to OR    Musculoskeletal:      Cervical back: Neck supple.      Right lower leg: No edema.      Left lower leg: No edema.     Skin:     General: Skin is warm and dry.      Capillary Refill: Capillary refill takes less than 2 seconds.     Neurological:      Mental Status: She is alert.     Psychiatric:         Mood and Affect: Mood normal.          Cervical Exam  Deferred     Contractions:  Non palpable     Fetal heart rate  156 bpm      Lab Results: I have reviewed the following results:    Blood type: B pos  Antibody: neg  GBS: unknown  HIV: NR  Rubella: immune  Syphilis IgM/IgG: NR  HBsAg: NR  HCAb: NR  Chlamydia: neg  Gonorrhea: neg  Early glucola: 116  Diabetes 1 hour screen: not yet drawn  3 hour glucose: n/a  Platelets: 253  Hgb: 12.5     Mya Keenan MD  PGY-III OB/GYN         [1]   Past Medical History:  Diagnosis Date    Anxiety     Contraception     Diarrhea     Ear piercing     GERD (gastroesophageal reflux disease)     IUD contraception 01/30/2020    Kyleena    Sleep related teeth grinding     Varicella     Had pox , and  shingles 18    Vitamin D deficiency    [2]   Past Surgical History:  Procedure Laterality Date    APPENDECTOMY  5/27/22    APPENDECTOMY LAPAROSCOPIC N/A 05/27/2022    Procedure: APPENDECTOMY LAPAROSCOPIC;  Surgeon: Chely Tapia MD;  Location: [facility] OR;  Service: General    COLONOSCOPY N/A 03/30/2018    Procedure: COLONOSCOPY;  Surgeon: Ralf Purdy MD;  Location: [facility] GI LAB;  Service: Gastroenterology    ESOPHAGOGASTRODUODENOSCOPY      [place] HYSTEROSCOPY DIAGNOSTIC SEPARATE PROCEDURE N/A 7/9/2024    Procedure: HYSTEROSCOPY FOR IUD REMOVAL, EXAM UNDER ANESTHESIA;   Surgeon: Tika Montero MD;  Location: [facility];  Service: Gynecology    WI REMOVAL INTRAUTERINE DEVICE IUD N/A 7/9/2024    Procedure: REMOVAL OF INTRAUTERINE DEVICE (IUD);  Surgeon: Tika Montero MD;  Location: [facility] OR;  Service: Gynecology    WISDOM TOOTH EXTRACTION     [3]   Social History  Tobacco Use    Smoking status: Former     Current packs/day: 0.00     Average packs/day: 0.1 packs/day for 2.0 years (0.2 ttl pk-yrs)     Types: Cigarettes     Start date: 2020     Quit date: 2022     Years since quitting: 3.5    Smokeless tobacco: Never    Tobacco comments:     Socially    Vaping Use    Vaping status: Former    Start date: 4/5/2016    Quit date: 7/2/2016    Substances: Nicotine, THC (medical marijunia  card), Flavoring   Substance and Sexual Activity    Alcohol use: Not Currently     Comment: Socially    Drug use: Yes     Types: Marijuana     Comment: medially    Sexual activity: Yes     Partners: Male     Birth control/protection: None

## 2025-07-08 NOTE — ANESTHESIA PROCEDURE NOTES
Spinal Block    Patient location during procedure: OB/L&D  Start time: 7/8/2025 2:33 PM  Reason for block: primary anesthetic  Staffing  Performed by: Venkatesh Benton MD  Authorized by: Venkatesh Benton MD    Preanesthetic Checklist  Completed: patient identified, IV checked, site marked, risks and benefits discussed, surgical consent, monitors and equipment checked, pre-op evaluation and timeout performed  Spinal Block  Patient position: sitting  Prep: ChloraPrep  Patient monitoring: frequent blood pressure checks, heart rate, cardiac monitor and continuous pulse ox  Approach: midline  Location: L3-4  Needle  Needle type: Cece   Needle gauge: 25 G  Needle length: 4 in  Assessment  Sensory level: T4  Injection Assessment:  negative aspiration for heme, no paresthesia on injection and positive aspiration for clear CSF.  Post-procedure:  site cleaned

## 2025-07-08 NOTE — ASSESSMENT & PLAN NOTE
Admit for planned cerclage   Indocin 50 mg prior to procedure and 25 mg q6 x 48 hrs after procedure   Vaginal progesterone 200 mg qhs   FU outpatient for repeat TVUS

## 2025-07-08 NOTE — ASSESSMENT & PLAN NOTE
7 mm incidentally at early anatomy scan on 7/7  SVE C/T/H on 7/7  Plan to proceed with US indicated cerclage

## 2025-07-08 NOTE — ANESTHESIA POSTPROCEDURE EVALUATION
Post-Op Assessment Note    CV Status:  Stable    Pain management: adequate       Mental Status:  Alert and awake   Hydration Status:  Euvolemic   PONV Controlled:  Controlled   Airway Patency:  Patent     Post Op Vitals Reviewed: Yes    No anethesia notable event occurred.    Staff: CRNA           Last Filed PACU Vitals:  Vitals Value Taken Time   Temp     Pulse 69 07/08/25 15:55   /59 07/08/25 15:52   Resp     SpO2 98 % 07/08/25 15:55   Vitals shown include unfiled device data.

## 2025-07-08 NOTE — DISCHARGE INSTR - AVS FIRST PAGE
Pregnancy at 19 to 22 Weeks   WHAT YOU NEED TO KNOW:   What changes are happening with my body?  Now that you are in your second trimester, you have more energy. You may also be feeling hungrier than usual. You may be gaining about ½ to 1 pound a week, and your pregnancy is beginning to show. You may need to start wearing maternity clothes. As your baby gets larger, you may have other symptoms. These may include body aches or stretch marks on your abdomen, breasts, thighs, or buttocks.  How do I care for myself at this stage of my pregnancy?       Eat a variety of healthy foods.  Healthy foods include fruits, vegetables, whole-grain breads, low-fat dairy foods, beans, lean meats, and fish. Drink liquids as directed. Ask how much liquid to drink each day and which liquids are best for you. Limit caffeine to less than 200 milligrams each day. Limit your intake of fish to 2 servings each week. Choose fish low in mercury such as canned light tuna, shrimp, salmon, cod, or tilapia. Do not  eat fish high in mercury such as swordfish, tilefish, travon mackerel, and shark.         Take prenatal vitamins as directed.  Your need for certain vitamins and minerals, such as folic acid, increases during pregnancy. Prenatal vitamins provide some of the extra vitamins and minerals you need. Prenatal vitamins may also help to decrease the risk of certain birth defects.         Talk to your healthcare provider about exercise.  Moderate exercise can help you stay fit. Your healthcare provider will help you plan an exercise program that is safe for you during pregnancy.         Do not smoke.  Smoking increases your risk of a miscarriage and other health problems during your pregnancy. Smoking can cause your baby to be born too early or weigh less at birth. Ask your healthcare provider for information if you need help quitting.    Do not drink alcohol.  Alcohol passes from your body to your baby through the placenta. It can affect your  baby's brain development and cause fetal alcohol syndrome (FAS). FAS is a group of conditions that causes mental, behavior, and growth problems.    Talk to your healthcare provider before you take any medicines.  Many medicines may harm your baby if you take them when you are pregnant. Do not take any medicines, vitamins, herbs, or supplements without first talking to your healthcare provider. Never use illegal or street drugs (such as marijuana or cocaine) while you are pregnant.    What are some safety tips during pregnancy?   Avoid hot tubs and saunas.  Do not use a hot tub or sauna while you are pregnant, especially during your first trimester. Hot tubs and saunas may raise your baby's temperature and increase the risk of birth defects.    Avoid toxoplasmosis.  This is an infection caused by eating raw meat or being around infected cat feces. It can cause birth defects, miscarriages, and other problems. Wash your hands after you touch raw meat. Make sure any meat is well-cooked before you eat it. Avoid raw eggs and unpasteurized milk. Use gloves or ask someone else to clean your cat's litter box while you are pregnant.       What changes are happening with my baby?  By 22 weeks, your baby is about 8 inches long from the top of the head to the rump (baby's bottom). Your baby also weighs about 1 pound. Your baby is becoming much more active. You may be able to feel the baby move inside you now. The first movements may not be that noticeable. They may feel like a fluttering sensation. As time goes on, your baby's movements will become stronger and more noticeable.  What do I need to know about prenatal care?  During the first 28 weeks of your pregnancy, you will see your healthcare provider once a month. Your healthcare provider will check your blood pressure and weight. You may also need the following:  A urine test  may also be done to check for sugar and protein. These can be signs of gestational diabetes or  infection. Protein in your urine may also be a sign of preeclampsia. Preeclampsia is a condition that can develop during week 20 or later of your pregnancy. It causes high blood pressure, and it can cause problems with your kidneys and other organs.    Fundal height  is a measurement of your uterus to check your baby's growth. This number is usually the same as the number of weeks that you have been pregnant.    A fetal ultrasound  shows pictures of your baby inside your uterus. It shows your baby's development. The movement and position of your baby can also be seen. Your healthcare provider may be able to tell you what your baby's gender is during the ultrasound.         Your baby's heart rate  will be checked.    When should I seek immediate care?   You develop a severe headache that does not go away.    You have new or increased vision changes, such as blurred or spotted vision.    You have new or increased swelling in your face or hands.    You have vaginal spotting or bleeding.    Your water broke or you feel warm water gushing or trickling from your vagina.    When should I call my doctor or obstetrician?   You have abdominal cramps, pressure, or tightening.    You have a change in vaginal discharge.    You cannot keep food or drinks down, and you are losing weight.    You have chills or a fever.    You have vaginal itching, burning, or pain.    You have yellow, green, white, or foul-smelling vaginal discharge.    You have pain or burning when you urinate, less urine than usual, or pink or bloody urine.    You have questions or concerns about your condition or care.    CARE AGREEMENT:   You have the right to help plan your care. Learn about your health condition and how it may be treated. Discuss treatment options with your healthcare providers to decide what care you want to receive. You always have the right to refuse treatment. The above information is an  only. It is not intended as medical  advice for individual conditions or treatments. Talk to your doctor, nurse or pharmacist before following any medical regimen to see if it is safe and effective for you.  © Copyright [employer] 2022 Information is for End User's use only and may not be sold, redistributed or otherwise used for commercial purposes. All illustrations and images included in CareNotes® are the copyrighted property of [employer] or [employer]

## 2025-07-09 LAB
ABO GROUP BLD: NORMAL
BLD GP AB SCN SERPL QL: NEGATIVE
RH BLD: NEGATIVE
TREPONEMA PALLIDUM IGG+IGM AB [PRESENCE] IN SERUM OR PLASMA BY IMMUNOASSAY: NORMAL

## 2025-07-09 NOTE — ANESTHESIA POSTPROCEDURE EVALUATION
Post-Op Assessment Note    CV Status:  Stable    Pain management: adequate       Mental Status:  Alert and awake   Hydration Status:  Euvolemic   PONV Controlled:  Controlled   Airway Patency:  Patent     Post Op Vitals Reviewed: Yes    No anethesia notable event occurred.    Staff: Anesthesiologist           Last Filed PACU Vitals:  Vitals Value Taken Time   Temp 97.7 °F (36.5 °C) 07/08/25 15:52   Pulse 75 07/08/25 16:25   /61 07/08/25 16:15   Resp 16 07/08/25 15:52   SpO2 98 % 07/08/25 16:25   Vitals shown include unfiled device data.    Modified Jose Angel:     Vitals Value Taken Time   Activity 2 07/08/25 16:15   Respiration 2 07/08/25 16:15   Circulation 2 07/08/25 16:15   Consciousness 2 07/08/25 16:15   Oxygen Saturation 2 07/08/25 16:15     Modified Jose Angel Score: 10

## 2025-07-10 ENCOUNTER — TELEPHONE (OUTPATIENT)
Age: 30
End: 2025-07-10

## 2025-07-10 ENCOUNTER — TELEPHONE (OUTPATIENT)
Dept: PERINATAL CARE | Facility: OTHER | Age: 30
End: 2025-07-10

## 2025-07-10 NOTE — TELEPHONE ENCOUNTER
Patient will be scheduled 7/24 in [facility] at 1245.     ----- Message from Adenike Clinton MD sent at 7/8/2025  5:04 PM EDT -----  Please call to make a 2 week tvs and missed anatomy with me after her cerclage we placed today.     Adenike

## 2025-07-11 ENCOUNTER — HOSPITAL ENCOUNTER (OUTPATIENT)
Facility: HOSPITAL | Age: 30
Discharge: HOME | End: 2025-07-11
Attending: OBSTETRICS & GYNECOLOGY | Admitting: OBSTETRICS & GYNECOLOGY
Payer: COMMERCIAL

## 2025-07-11 VITALS
DIASTOLIC BLOOD PRESSURE: 59 MMHG | SYSTOLIC BLOOD PRESSURE: 125 MMHG | BODY MASS INDEX: 33.84 KG/M2 | RESPIRATION RATE: 18 BRPM | TEMPERATURE: 98.4 F | HEIGHT: 63 IN | WEIGHT: 191 LBS | HEART RATE: 86 BPM

## 2025-07-11 PROBLEM — N89.8 VAGINAL DISCHARGE DURING PREGNANCY: Status: ACTIVE | Noted: 2025-07-11

## 2025-07-11 PROBLEM — O34.32 CERVICAL CERCLAGE SUTURE PRESENT IN SECOND TRIMESTER: Status: ACTIVE | Noted: 2025-07-11

## 2025-07-11 PROBLEM — O26.899 VAGINAL DISCHARGE DURING PREGNANCY: Status: ACTIVE | Noted: 2025-07-11

## 2025-07-11 PROCEDURE — G0463 HOSPITAL OUTPT CLINIC VISIT: HCPCS

## 2025-07-11 PROCEDURE — 99214 OFFICE O/P EST MOD 30 MIN: CPT

## 2025-07-11 PROCEDURE — NC001 PR NO CHARGE: Performed by: OBSTETRICS & GYNECOLOGY

## 2025-07-11 NOTE — DISCHARGE INSTR - AVS FIRST PAGE
Pregnancy at 19 to 22 Weeks   WHAT YOU NEED TO KNOW:   What changes are happening with my body?  Now that you are in your second trimester, you have more energy. You may also be feeling hungrier than usual. You may be gaining about ½ to 1 pound a week, and your pregnancy is beginning to show. You may need to start wearing maternity clothes. As your baby gets larger, you may have other symptoms. These may include body aches or stretch marks on your abdomen, breasts, thighs, or buttocks.  How do I care for myself at this stage of my pregnancy?       Eat a variety of healthy foods.  Healthy foods include fruits, vegetables, whole-grain breads, low-fat dairy foods, beans, lean meats, and fish. Drink liquids as directed. Ask how much liquid to drink each day and which liquids are best for you. Limit caffeine to less than 200 milligrams each day. Limit your intake of fish to 2 servings each week. Choose fish low in mercury such as canned light tuna, shrimp, salmon, cod, or tilapia. Do not  eat fish high in mercury such as swordfish, tilefish, travon mackerel, and shark.         Take prenatal vitamins as directed.  Your need for certain vitamins and minerals, such as folic acid, increases during pregnancy. Prenatal vitamins provide some of the extra vitamins and minerals you need. Prenatal vitamins may also help to decrease the risk of certain birth defects.         Talk to your healthcare provider about exercise.  Moderate exercise can help you stay fit. Your healthcare provider will help you plan an exercise program that is safe for you during pregnancy.         Do not smoke.  Smoking increases your risk of a miscarriage and other health problems during your pregnancy. Smoking can cause your baby to be born too early or weigh less at birth. Ask your healthcare provider for information if you need help quitting.    Do not drink alcohol.  Alcohol passes from your body to your baby through the placenta. It can affect your  baby's brain development and cause fetal alcohol syndrome (FAS). FAS is a group of conditions that causes mental, behavior, and growth problems.    Talk to your healthcare provider before you take any medicines.  Many medicines may harm your baby if you take them when you are pregnant. Do not take any medicines, vitamins, herbs, or supplements without first talking to your healthcare provider. Never use illegal or street drugs (such as marijuana or cocaine) while you are pregnant.    What are some safety tips during pregnancy?   Avoid hot tubs and saunas.  Do not use a hot tub or sauna while you are pregnant, especially during your first trimester. Hot tubs and saunas may raise your baby's temperature and increase the risk of birth defects.    Avoid toxoplasmosis.  This is an infection caused by eating raw meat or being around infected cat feces. It can cause birth defects, miscarriages, and other problems. Wash your hands after you touch raw meat. Make sure any meat is well-cooked before you eat it. Avoid raw eggs and unpasteurized milk. Use gloves or ask someone else to clean your cat's litter box while you are pregnant.       What changes are happening with my baby?  By 22 weeks, your baby is about 8 inches long from the top of the head to the rump (baby's bottom). Your baby also weighs about 1 pound. Your baby is becoming much more active. You may be able to feel the baby move inside you now. The first movements may not be that noticeable. They may feel like a fluttering sensation. As time goes on, your baby's movements will become stronger and more noticeable.  What do I need to know about prenatal care?  During the first 28 weeks of your pregnancy, you will see your healthcare provider once a month. Your healthcare provider will check your blood pressure and weight. You may also need the following:  A urine test  may also be done to check for sugar and protein. These can be signs of gestational diabetes or  infection. Protein in your urine may also be a sign of preeclampsia. Preeclampsia is a condition that can develop during week 20 or later of your pregnancy. It causes high blood pressure, and it can cause problems with your kidneys and other organs.    Fundal height  is a measurement of your uterus to check your baby's growth. This number is usually the same as the number of weeks that you have been pregnant.    A fetal ultrasound  shows pictures of your baby inside your uterus. It shows your baby's development. The movement and position of your baby can also be seen. Your healthcare provider may be able to tell you what your baby's gender is during the ultrasound.         Your baby's heart rate  will be checked.    When should I seek immediate care?   You develop a severe headache that does not go away.    You have new or increased vision changes, such as blurred or spotted vision.    You have new or increased swelling in your face or hands.    You have vaginal spotting or bleeding.    Your water broke or you feel warm water gushing or trickling from your vagina.    When should I call my doctor or obstetrician?   You have abdominal cramps, pressure, or tightening.    You have a change in vaginal discharge.    You cannot keep food or drinks down, and you are losing weight.    You have chills or a fever.    You have vaginal itching, burning, or pain.    You have yellow, green, white, or foul-smelling vaginal discharge.    You have pain or burning when you urinate, less urine than usual, or pink or bloody urine.    You have questions or concerns about your condition or care.    CARE AGREEMENT:   You have the right to help plan your care. Learn about your health condition and how it may be treated. Discuss treatment options with your healthcare providers to decide what care you want to receive. You always have the right to refuse treatment. The above information is an  only. It is not intended as medical  advice for individual conditions or treatments. Talk to your doctor, nurse or pharmacist before following any medical regimen to see if it is safe and effective for you.  © Copyright [employer] 2022 Information is for End User's use only and may not be sold, redistributed or otherwise used for commercial purposes. All illustrations and images included in CareNotes® are the copyrighted property of [employer] or [employer]

## 2025-07-11 NOTE — PROGRESS NOTES
L&D Triage Note - OB/GYN  Merna Zarco 29 y.o. female MRN: [id number]  Unit/Bed#:  TRIAGE 4 Encounter: [id number]      ASSESSMENT:    Merna Zarco is a 29 y.o.  at 21w1d presenting with leakage of fluid. Rupture workup negative and DVP wnl. Patient was started on vaginal progesterone following cerclage placement and the increase in discharge is likely secondary to the progesterone. UA ordered to rule out UTI, though low clinical suspicion based on exam. Patient does not appear to be ruptured and it is safe for discharge to home with return precautions.      PLAN:    1) Leakage of fluid  - No pooling, no ferning, negative nitrazine  - DVP wnl: 3.49 cm (deepest vertical pocket)  - Urine culture    2) short cervix s/p cerclage  - cerclage visualized on exam, cervix visibly closed, cerclage not on excessive tension   - this was an US indicated cerclage for a CL < 1 cm with no history of PTD  - reiterated recommendations for pelvic rest throughout the pregnancy and avoiding submerging     3) 21 weeks 1d gestation of pregnancy  - Continue routine prenatal care  - Discharge from OB triage with  labor precautions    - Reviewed rupture of membranes, false vs true labor, decreased fetal movement, and vaginal bleeding   - Pt to call provider with any concerns and follow up at her next scheduled prenatal appointment    - Case discussed with Dr. Rodgers    SUBJECTIVE:    Merna Zarco 29 y.o.  at 21w1d with an Estimated Date of Delivery: 25 who presents to triage for leakage of fluid.  She is day 3 s/p cerclage placement. She explained that this morning at 8am she was experiencing a large amount of discharge. The discharge was white at the start and became progressively more yellowish/red. It was non-odorous. Due to the copious amount and change in color, patient felt concerned that this was abnormal. She also endorses very mild cramping that started upon her arrival to the unit. She notes that  she took her last dose of Indocin at 19:00 last night. Patient has been on vaginal progesterone following the placement of her cerclage. She otherwise denies contractions, vaginal bleeding, and decreased fetal movement. Patient also denies SOB, chest pain, nausea/vomiting, fevers/chills, and back pain. Endorses some mild burning with urination after using the progesterone.    Her current obstetrical history is significant for short cervix s/p cerclage placement on 7/08, iron deficiency anemia, obesity, and chronic HTN    Her past obstetrical history is not significant as this is her first pregnancy.    OBJECTIVE:    Vitals:    07/11/25 0946   BP: 125/59   Pulse: 86   Resp: 18   Temp: 98.4 °F (36.9 °C)       ROS:  Constitutional: Negative  Respiratory: Negative  Cardiovascular: Negative    Gastrointestinal: Negative    General Physical Exam:  General: Well appearing, no distress  Respiratory: Unlabored breathing  Cardiovascular: Regular rate.  Abdomen: Soft, gravid, nontender  Fundal Height: Appropriate for gestational age.  Extremities: Warm and well perfused.  Non tender.      Cervical Exam  Speculum: Cervical os is closed, no pooling, small amount of white discharge, no bleeding    FETAL ASSESSMENT:  Fetal heart rate: 152 bpm with doppler   no palpable contractions    KOH/WTMT:     Infection:   - no clue cells    - no hyphae   - no trichomonads present    Membrane status   - no ferning   - negative nitrazine   - no pooling     Imaging:      Abd. US   [email or web address]     - Deepest vertical pocket:3.49 cm    Presentation: Anterior placenta, baby transverse      Sandi Jarquin  7/11/2025  10:53 AM

## 2025-07-15 LAB — SCAN RESULT: NORMAL

## 2025-07-24 ENCOUNTER — ROUTINE PRENATAL (OUTPATIENT)
Dept: PERINATAL CARE | Facility: OTHER | Age: 30
End: 2025-07-24
Payer: COMMERCIAL

## 2025-07-24 ENCOUNTER — ANCILLARY PROCEDURE (OUTPATIENT)
Dept: PERINATAL CARE | Facility: OTHER | Age: 30
End: 2025-07-24
Attending: OBSTETRICS & GYNECOLOGY
Payer: COMMERCIAL

## 2025-07-24 VITALS
WEIGHT: 194 LBS | HEIGHT: 63 IN | SYSTOLIC BLOOD PRESSURE: 124 MMHG | HEART RATE: 82 BPM | BODY MASS INDEX: 34.38 KG/M2 | DIASTOLIC BLOOD PRESSURE: 72 MMHG

## 2025-07-24 DIAGNOSIS — O26.899 RH NEGATIVE STATE IN ANTEPARTUM PERIOD: ICD-10-CM

## 2025-07-24 DIAGNOSIS — O34.32 CERVICAL CERCLAGE SUTURE PRESENT IN SECOND TRIMESTER: Primary | ICD-10-CM

## 2025-07-24 DIAGNOSIS — Z67.91 RH NEGATIVE STATE IN ANTEPARTUM PERIOD: ICD-10-CM

## 2025-07-24 DIAGNOSIS — Z3A.23 23 WEEKS GESTATION OF PREGNANCY: ICD-10-CM

## 2025-07-24 DIAGNOSIS — O26.872 SHORT CERVIX DURING PREGNANCY IN SECOND TRIMESTER: ICD-10-CM

## 2025-07-24 DIAGNOSIS — Z33.1 NORMAL PREGNANCY, INCIDENTAL: ICD-10-CM

## 2025-07-24 PROCEDURE — 76815 OB US LIMITED FETUS(S): CPT | Performed by: OBSTETRICS & GYNECOLOGY

## 2025-07-24 PROCEDURE — 99214 OFFICE O/P EST MOD 30 MIN: CPT | Performed by: OBSTETRICS & GYNECOLOGY

## 2025-07-24 PROCEDURE — 76817 TRANSVAGINAL US OBSTETRIC: CPT | Performed by: OBSTETRICS & GYNECOLOGY

## 2025-07-24 RX ORDER — PROGESTERONE 200 MG/1
1 CAPSULE ORAL
Qty: 90 CAPSULE | Refills: 1 | Status: SHIPPED | OUTPATIENT
Start: 2025-07-24

## 2025-07-24 NOTE — ASSESSMENT & PLAN NOTE
We discussed the signs and symptoms of  labor and when to contact her OB office.    Discussed how to feel for contractions and to titrate her activity to keep her contractions less than 4 times in an hour.    Recommend pelvic rest till her cerclage is removed which means recommend avoiding intercourse till her cerclage is removed.    Orders:    Progesterone 200 MG CAPS; Insert 1 capsule into the vagina daily at bedtime Please place 1 tablet in the vaginal before bedtime starting at 24 weeks and continue till 36w6d

## 2025-07-24 NOTE — ASSESSMENT & PLAN NOTE
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  Recommends we treat her as if she is Rh- such that she will receive RhoGAM for any episodes of bleeding in pregnancy, at 28 weeks and if her baby is Rh+ at birth.

## 2025-07-24 NOTE — LETTER
2025     Africa Cody PA-C  [address] [facility] [address]    Patient: Merna Zarco   YOB: 1995   Date of Visit: 2025       Dear Dr. Africa Cody PA-C:    Thank you for referring Merna Zarco to me for evaluation. Below are my notes for this consultation.    If you have questions, please do not hesitate to call me. I look forward to following your patient along with you.         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  Recommends we treat her as if she is Rh- such that she will receive RhoGAM for any episodes of bleeding in pregnancy, at 28 weeks and if her baby is Rh+ at birth.        Short cervix during pregnancy in second trimester  0.7 mm found at her anatomy scan.  Had an emergency cerclage placed.    Cervical length today is 2.3 cm.  Cerclage is intact.   Continue vaginal  progesterone until 36 weeks and 6 days.    Recommend growth scan at 28 weeks.  Orders:  •  Progesterone 200 MG CAPS; Insert 1 capsule into the vagina daily at bedtime Please place 1 tablet in the vaginal before bedtime starting at 24 weeks and continue till 36w6d  Patient's initial prescription for progesterone was not going to last till 36 weeks and 6 days so I sent another prescription to her pharmacy that should be more than enough to get her to 36 weeks and 6 days.  23 weeks gestation of pregnancy    Orders:  •  Progesterone 200 MG CAPS; Insert 1 capsule into the vagina daily at bedtime Please place 1 tablet in the vaginal before bedtime  "starting at 24 weeks and continue till 36w6d    Cervical cerclage suture present in second trimester  We discussed the signs and symptoms of  labor and when to contact her OB office.    Discussed how to feel for contractions and to titrate her activity to keep her contractions less than 4 times in an hour.    Recommend pelvic rest till her cerclage is removed which means recommend avoiding intercourse till her cerclage is removed.    Orders:  •  Progesterone 200 MG CAPS; Insert 1 capsule into the vagina daily at bedtime Please place 1 tablet in the vaginal before bedtime starting at 24 weeks and continue till 36w6d        History of Present Illness    Merna Zarco is a 29 y.o. female  at 23w0d who presents for a cervical length status post cerclage and review of the missed anatomy. She reports no obstetric complaints today.    Objective  /72 (BP Location: Right arm, Patient Position: Sitting, Cuff Size: Standard)   Pulse 82   Ht 5' 3\" (1.6 m)   Wt 88 kg (194 lb)   LMP 2025 (Exact Date)   BMI 34.37 kg/m²      Patient's last menstrual period was 2025 (exact date).  Estimated Delivery Date: 2025, by Last Menstrual Period      Please refer to \"Imaging\" for ultrasound report from today's visit.     Pre visit time reviewing her records 5 minutes  Face to face time 15 minutes  Post visit time on documentation of note, updating her problem list, adding orders and prescriptions 10 minutes.  Procedures that were completed today were charged separately.   The level of decision making was moderate complexity.    Adenike Clinton MD    "

## 2025-07-24 NOTE — PROGRESS NOTES
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  Recommends we treat her as if she is Rh- such that she will receive RhoGAM for any episodes of bleeding in pregnancy, at 28 weeks and if her baby is Rh+ at birth.        Short cervix during pregnancy in second trimester  0.7 mm found at her anatomy scan.  Had an emergency cerclage placed.    Cervical length today is 2.3 cm.  Cerclage is intact.   Continue vaginal  progesterone until 36 weeks and 6 days.    Recommend growth scan at 28 weeks.  Orders:    Progesterone 200 MG CAPS; Insert 1 capsule into the vagina daily at bedtime Please place 1 tablet in the vaginal before bedtime starting at 24 weeks and continue till 36w6d  Patient's initial prescription for progesterone was not going to last till 36 weeks and 6 days so I sent another prescription to her pharmacy that should be more than enough to get her to 36 weeks and 6 days.  23 weeks gestation of pregnancy    Orders:    Progesterone 200 MG CAPS; Insert 1 capsule into the vagina daily at bedtime Please place 1 tablet in the vaginal before bedtime starting at 24 weeks and continue till 36w6d    Cervical cerclage suture present in second trimester  We discussed the signs and symptoms of  labor and when to contact her OB office.    Discussed how to feel for contractions and to titrate her activity to keep her contractions less than 4 times in an hour.    Recommend pelvic rest till her cerclage is removed which means recommend avoiding intercourse till her cerclage is removed.    Orders:    Progesterone 200 MG CAPS; Insert 1 capsule into the vagina daily at bedtime Please place 1 tablet in the vaginal before bedtime starting  "at 24 weeks and continue till 36w6d        History of Present Illness     Merna Zarco is a 29 y.o. female  at 23w0d who presents for a cervical length status post cerclage and review of the missed anatomy. She reports no obstetric complaints today.    Objective   /72 (BP Location: Right arm, Patient Position: Sitting, Cuff Size: Standard)   Pulse 82   Ht 5' 3\" (1.6 m)   Wt 88 kg (194 lb)   LMP 2025 (Exact Date)   BMI 34.37 kg/m²      Patient's last menstrual period was 2025 (exact date).  Estimated Delivery Date: 2025, by Last Menstrual Period      Please refer to \"Imaging\" for ultrasound report from today's visit.     Pre visit time reviewing her records 5 minutes  Face to face time 15 minutes  Post visit time on documentation of note, updating her problem list, adding orders and prescriptions 10 minutes.  Procedures that were completed today were charged separately.   The level of decision making was moderate complexity.    Adenike Clinton MD    "

## 2025-08-04 ENCOUNTER — ROUTINE PRENATAL (OUTPATIENT)
Dept: OBGYN CLINIC | Facility: CLINIC | Age: 30
End: 2025-08-04

## 2025-08-04 VITALS — WEIGHT: 193 LBS | SYSTOLIC BLOOD PRESSURE: 134 MMHG | BODY MASS INDEX: 34.19 KG/M2 | DIASTOLIC BLOOD PRESSURE: 70 MMHG

## 2025-08-04 DIAGNOSIS — O26.899 RH NEGATIVE STATE IN ANTEPARTUM PERIOD: ICD-10-CM

## 2025-08-04 DIAGNOSIS — O26.879 SHORT CERVIX AFFECTING PREGNANCY: ICD-10-CM

## 2025-08-04 DIAGNOSIS — Z67.91 RH NEGATIVE STATE IN ANTEPARTUM PERIOD: ICD-10-CM

## 2025-08-04 DIAGNOSIS — O10.919 CHRONIC HYPERTENSION AFFECTING PREGNANCY: ICD-10-CM

## 2025-08-04 DIAGNOSIS — O34.32 CERVICAL CERCLAGE SUTURE PRESENT IN SECOND TRIMESTER: ICD-10-CM

## 2025-08-04 DIAGNOSIS — Z3A.24 24 WEEKS GESTATION OF PREGNANCY: Primary | ICD-10-CM

## 2025-08-04 PROCEDURE — PNV: Performed by: OBSTETRICS & GYNECOLOGY

## (undated) DEVICE — INTENT TO BE USED WITH SUTURE MATERIAL FOR TISSUE CLOSURE: Brand: RICHARD-ALLAN® NEEDLE 1/2 CIRCLE TAPER

## (undated) DEVICE — Device

## (undated) DEVICE — GLOVE INDICATOR PI UNDERGLOVE SZ 6.5 BLUE

## (undated) DEVICE — RED RUBBER ROBINSON URETHRAL CATHETER, RADIOPAQUE E, SMOOTH ROUNDED TIP, 12 FR (4.0 MM): Brand: DOVER

## (undated) DEVICE — DISPOSABLE OR TOWEL: Brand: CARDINAL HEALTH

## (undated) DEVICE — IV EXTENSION TUBING 33 IN

## (undated) DEVICE — BOWL 32OZ TURQUOISE NON-STERILE BULK NS 100/CS: Brand: MEDEGEN MEDICAL PRODUCTS

## (undated) DEVICE — AAMI LEVEL 4 POLY-REINFORCED SURGICAL GOWN, X-LARGE, STERILE, SET-IN: Brand: CONVERTORS

## (undated) DEVICE — NEEDLE COUNTER, 1840 FOAM BLOCK: Brand: DEVON

## (undated) DEVICE — GLOVE SRG BIOGEL 6.5

## (undated) DEVICE — STRL ALLENTOWN HYSTEROSCOPY PK: Brand: CARDINAL HEALTH

## (undated) DEVICE — UNDER BUTTOCKS DRAPE W/ESTIMATED FLUID LOSS POUCH: Brand: CONVERTORS

## (undated) DEVICE — BETHLEHEM UNIVERSAL MINOR VAG: Brand: CARDINAL HEALTH

## (undated) DEVICE — PVC URETHRAL CATHETER: Brand: DOVER

## (undated) DEVICE — PERI/GYN PACK: Brand: CONVERTORS

## (undated) DEVICE — CHLORHEXIDINE 4PCT 4 OZ

## (undated) DEVICE — CYSTO TUBING SINGLE IRRIGATION

## (undated) DEVICE — PREMIUM DRY TRAY LF: Brand: MEDLINE INDUSTRIES, INC.